# Patient Record
Sex: FEMALE | Race: WHITE | NOT HISPANIC OR LATINO
[De-identification: names, ages, dates, MRNs, and addresses within clinical notes are randomized per-mention and may not be internally consistent; named-entity substitution may affect disease eponyms.]

---

## 2019-07-17 PROBLEM — Z00.00 ENCOUNTER FOR PREVENTIVE HEALTH EXAMINATION: Status: ACTIVE | Noted: 2019-07-17

## 2019-08-16 ENCOUNTER — APPOINTMENT (OUTPATIENT)
Dept: ENDOCRINOLOGY | Facility: CLINIC | Age: 54
End: 2019-08-16

## 2019-08-20 ENCOUNTER — EMERGENCY (EMERGENCY)
Facility: HOSPITAL | Age: 54
LOS: 0 days | Discharge: HOME | End: 2019-08-21
Attending: EMERGENCY MEDICINE
Payer: COMMERCIAL

## 2019-08-20 VITALS — WEIGHT: 177.91 LBS | HEIGHT: 69 IN

## 2019-08-20 DIAGNOSIS — M25.571 PAIN IN RIGHT ANKLE AND JOINTS OF RIGHT FOOT: ICD-10-CM

## 2019-08-20 DIAGNOSIS — E03.9 HYPOTHYROIDISM, UNSPECIFIED: ICD-10-CM

## 2019-08-20 DIAGNOSIS — M79.662 PAIN IN LEFT LOWER LEG: ICD-10-CM

## 2019-08-20 DIAGNOSIS — S92.412A DISPLACED FRACTURE OF PROXIMAL PHALANX OF LEFT GREAT TOE, INITIAL ENCOUNTER FOR CLOSED FRACTURE: ICD-10-CM

## 2019-08-20 DIAGNOSIS — Z88.5 ALLERGY STATUS TO NARCOTIC AGENT: ICD-10-CM
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
## 2019-08-20 DIAGNOSIS — M79.661 PAIN IN RIGHT LOWER LEG: ICD-10-CM

## 2019-08-20 DIAGNOSIS — Z88.1 ALLERGY STATUS TO OTHER ANTIBIOTIC AGENTS STATUS: ICD-10-CM

## 2019-08-20 PROCEDURE — 73620 X-RAY EXAM OF FOOT: CPT | Mod: LT

## 2019-08-20 PROCEDURE — 99284 EMERGENCY DEPT VISIT MOD MDM: CPT

## 2019-08-20 PROCEDURE — 96372 THER/PROPH/DIAG INJ SC/IM: CPT | Mod: XU

## 2019-08-20 PROCEDURE — 73610 X-RAY EXAM OF ANKLE: CPT | Mod: 50

## 2019-08-20 PROCEDURE — 73590 X-RAY EXAM OF LOWER LEG: CPT | Mod: 50

## 2019-08-20 PROCEDURE — 73610 X-RAY EXAM OF ANKLE: CPT | Mod: 26,50

## 2019-08-20 PROCEDURE — 99284 EMERGENCY DEPT VISIT MOD MDM: CPT | Mod: 25

## 2019-08-20 PROCEDURE — 73590 X-RAY EXAM OF LOWER LEG: CPT | Mod: 26,50

## 2019-08-20 PROCEDURE — 29515 APPLICATION SHORT LEG SPLINT: CPT | Mod: LT

## 2019-08-20 PROCEDURE — 73620 X-RAY EXAM OF FOOT: CPT | Mod: 26,LT

## 2019-08-20 RX ORDER — KETOROLAC TROMETHAMINE 30 MG/ML
30 SYRINGE (ML) INJECTION ONCE
Refills: 0 | Status: DISCONTINUED | OUTPATIENT
Start: 2019-08-20 | End: 2019-08-20

## 2019-08-20 RX ORDER — OXYCODONE AND ACETAMINOPHEN 5; 325 MG/1; MG/1
1 TABLET ORAL ONCE
Refills: 0 | Status: DISCONTINUED | OUTPATIENT
Start: 2019-08-20 | End: 2019-08-20

## 2019-08-20 RX ADMIN — OXYCODONE AND ACETAMINOPHEN 1 TABLET(S): 5; 325 TABLET ORAL at 23:39

## 2019-08-20 RX ADMIN — OXYCODONE AND ACETAMINOPHEN 1 TABLET(S): 5; 325 TABLET ORAL at 23:01

## 2019-08-20 RX ADMIN — Medication 30 MILLIGRAM(S): at 23:39

## 2019-08-20 RX ADMIN — Medication 30 MILLIGRAM(S): at 23:01

## 2019-08-20 NOTE — ED PROVIDER NOTE - PHYSICAL EXAMINATION
TTP to b/l tibia with no deformities   right ankle TTP   left shin with multiple abrasions   TTP to left ankle and great toe
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
## 2019-08-20 NOTE — ED PROVIDER NOTE - CLINICAL SUMMARY MEDICAL DECISION MAKING FREE TEXT BOX
Left great toe middle phalynx fracture.  All other XRs appear okay.  Some scattered abrasions, cleaned and dressed by nursing.  Podiatry consulted, placed patient in posterior splint.  Plan for d/c home, f/u with Dr. Wells.

## 2019-08-21 VITALS
DIASTOLIC BLOOD PRESSURE: 55 MMHG | TEMPERATURE: 98 F | HEART RATE: 74 BPM | SYSTOLIC BLOOD PRESSURE: 116 MMHG | OXYGEN SATURATION: 98 % | RESPIRATION RATE: 18 BRPM

## 2019-08-21 RX ORDER — OXYCODONE AND ACETAMINOPHEN 5; 325 MG/1; MG/1
1 TABLET ORAL ONCE
Refills: 0 | Status: DISCONTINUED | OUTPATIENT
Start: 2019-08-21 | End: 2019-08-21

## 2019-08-21 RX ADMIN — OXYCODONE AND ACETAMINOPHEN 1 TABLET(S): 5; 325 TABLET ORAL at 00:38

## 2019-08-28 ENCOUNTER — TRANSCRIPTION ENCOUNTER (OUTPATIENT)
Age: 54
End: 2019-08-28

## 2019-08-29 ENCOUNTER — INPATIENT (INPATIENT)
Facility: HOSPITAL | Age: 54
LOS: 5 days | Discharge: HOME | DRG: 580 | End: 2019-09-04
Attending: ORTHOPAEDIC SURGERY | Admitting: ORTHOPAEDIC SURGERY
Payer: COMMERCIAL

## 2019-08-29 VITALS
OXYGEN SATURATION: 97 % | WEIGHT: 175.05 LBS | TEMPERATURE: 98 F | HEIGHT: 69 IN | HEART RATE: 80 BPM | DIASTOLIC BLOOD PRESSURE: 72 MMHG | SYSTOLIC BLOOD PRESSURE: 134 MMHG | RESPIRATION RATE: 16 BRPM

## 2019-08-29 DIAGNOSIS — Z90.711 ACQUIRED ABSENCE OF UTERUS WITH REMAINING CERVICAL STUMP: Chronic | ICD-10-CM

## 2019-08-29 DIAGNOSIS — Z98.890 OTHER SPECIFIED POSTPROCEDURAL STATES: Chronic | ICD-10-CM

## 2019-08-29 DIAGNOSIS — L03.116 CELLULITIS OF LEFT LOWER LIMB: ICD-10-CM

## 2019-08-29 DIAGNOSIS — Z90.49 ACQUIRED ABSENCE OF OTHER SPECIFIED PARTS OF DIGESTIVE TRACT: Chronic | ICD-10-CM

## 2019-08-29 DIAGNOSIS — Z90.09 ACQUIRED ABSENCE OF OTHER PART OF HEAD AND NECK: Chronic | ICD-10-CM

## 2019-08-29 PROBLEM — E03.9 HYPOTHYROIDISM, UNSPECIFIED: Chronic | Status: ACTIVE | Noted: 2019-08-21

## 2019-08-29 LAB
ALBUMIN SERPL ELPH-MCNC: 3.8 G/DL — SIGNIFICANT CHANGE UP (ref 3.3–5)
ALP SERPL-CCNC: 173 U/L — HIGH (ref 30–120)
ALT FLD-CCNC: 84 U/L DA — HIGH (ref 10–60)
ANION GAP SERPL CALC-SCNC: 6 MMOL/L — SIGNIFICANT CHANGE UP (ref 5–17)
APTT BLD: 36.5 SEC — SIGNIFICANT CHANGE UP (ref 28.5–37)
AST SERPL-CCNC: 33 U/L — SIGNIFICANT CHANGE UP (ref 10–40)
BASOPHILS # BLD AUTO: 0.09 K/UL — SIGNIFICANT CHANGE UP (ref 0–0.2)
BASOPHILS NFR BLD AUTO: 1.2 % — SIGNIFICANT CHANGE UP (ref 0–2)
BILIRUB SERPL-MCNC: 0.4 MG/DL — SIGNIFICANT CHANGE UP (ref 0.2–1.2)
BUN SERPL-MCNC: 14 MG/DL — SIGNIFICANT CHANGE UP (ref 7–23)
CALCIUM SERPL-MCNC: 9.4 MG/DL — SIGNIFICANT CHANGE UP (ref 8.4–10.5)
CHLORIDE SERPL-SCNC: 102 MMOL/L — SIGNIFICANT CHANGE UP (ref 96–108)
CO2 SERPL-SCNC: 28 MMOL/L — SIGNIFICANT CHANGE UP (ref 22–31)
CREAT SERPL-MCNC: 0.74 MG/DL — SIGNIFICANT CHANGE UP (ref 0.5–1.3)
EOSINOPHIL # BLD AUTO: 0.19 K/UL — SIGNIFICANT CHANGE UP (ref 0–0.5)
EOSINOPHIL NFR BLD AUTO: 2.6 % — SIGNIFICANT CHANGE UP (ref 0–6)
GLUCOSE SERPL-MCNC: 119 MG/DL — HIGH (ref 70–99)
HCG SERPL-ACNC: 2 MIU/ML — SIGNIFICANT CHANGE UP
HCT VFR BLD CALC: 40.1 % — SIGNIFICANT CHANGE UP (ref 34.5–45)
HGB BLD-MCNC: 13 G/DL — SIGNIFICANT CHANGE UP (ref 11.5–15.5)
IMM GRANULOCYTES NFR BLD AUTO: 0.6 % — SIGNIFICANT CHANGE UP (ref 0–1.5)
INR BLD: 1.13 RATIO — SIGNIFICANT CHANGE UP (ref 0.88–1.16)
LYMPHOCYTES # BLD AUTO: 1.83 K/UL — SIGNIFICANT CHANGE UP (ref 1–3.3)
LYMPHOCYTES # BLD AUTO: 25.2 % — SIGNIFICANT CHANGE UP (ref 13–44)
MCHC RBC-ENTMCNC: 30.1 PG — SIGNIFICANT CHANGE UP (ref 27–34)
MCHC RBC-ENTMCNC: 32.4 GM/DL — SIGNIFICANT CHANGE UP (ref 32–36)
MCV RBC AUTO: 92.8 FL — SIGNIFICANT CHANGE UP (ref 80–100)
MONOCYTES # BLD AUTO: 0.63 K/UL — SIGNIFICANT CHANGE UP (ref 0–0.9)
MONOCYTES NFR BLD AUTO: 8.7 % — SIGNIFICANT CHANGE UP (ref 2–14)
NEUTROPHILS # BLD AUTO: 4.47 K/UL — SIGNIFICANT CHANGE UP (ref 1.8–7.4)
NEUTROPHILS NFR BLD AUTO: 61.7 % — SIGNIFICANT CHANGE UP (ref 43–77)
NRBC # BLD: 0 /100 WBCS — SIGNIFICANT CHANGE UP (ref 0–0)
PLATELET # BLD AUTO: 380 K/UL — SIGNIFICANT CHANGE UP (ref 150–400)
POTASSIUM SERPL-MCNC: 3.5 MMOL/L — SIGNIFICANT CHANGE UP (ref 3.5–5.3)
POTASSIUM SERPL-SCNC: 3.5 MMOL/L — SIGNIFICANT CHANGE UP (ref 3.5–5.3)
PROT SERPL-MCNC: 8 G/DL — SIGNIFICANT CHANGE UP (ref 6–8.3)
PROTHROM AB SERPL-ACNC: 12.4 SEC — SIGNIFICANT CHANGE UP (ref 10–12.9)
RBC # BLD: 4.32 M/UL — SIGNIFICANT CHANGE UP (ref 3.8–5.2)
RBC # FLD: 13.1 % — SIGNIFICANT CHANGE UP (ref 10.3–14.5)
SODIUM SERPL-SCNC: 136 MMOL/L — SIGNIFICANT CHANGE UP (ref 135–145)
WBC # BLD: 7.25 K/UL — SIGNIFICANT CHANGE UP (ref 3.8–10.5)
WBC # FLD AUTO: 7.25 K/UL — SIGNIFICANT CHANGE UP (ref 3.8–10.5)

## 2019-08-29 PROCEDURE — 71045 X-RAY EXAM CHEST 1 VIEW: CPT | Mod: 26

## 2019-08-29 PROCEDURE — 93970 EXTREMITY STUDY: CPT | Mod: 26

## 2019-08-29 PROCEDURE — 93010 ELECTROCARDIOGRAM REPORT: CPT

## 2019-08-29 PROCEDURE — 99285 EMERGENCY DEPT VISIT HI MDM: CPT

## 2019-08-29 PROCEDURE — 99221 1ST HOSP IP/OBS SF/LOW 40: CPT

## 2019-08-29 RX ORDER — LEVOTHYROXINE SODIUM 125 MCG
1 TABLET ORAL
Qty: 0 | Refills: 0 | DISCHARGE

## 2019-08-29 RX ORDER — SODIUM CHLORIDE 9 MG/ML
1000 INJECTION, SOLUTION INTRAVENOUS
Refills: 0 | Status: DISCONTINUED | OUTPATIENT
Start: 2019-08-29 | End: 2019-08-30

## 2019-08-29 RX ORDER — ENOXAPARIN SODIUM 100 MG/ML
40 INJECTION SUBCUTANEOUS ONCE
Refills: 0 | Status: COMPLETED | OUTPATIENT
Start: 2019-08-29 | End: 2019-08-29

## 2019-08-29 RX ORDER — LEVOTHYROXINE SODIUM 125 MCG
137 TABLET ORAL DAILY
Refills: 0 | Status: DISCONTINUED | OUTPATIENT
Start: 2019-08-29 | End: 2019-08-30

## 2019-08-29 RX ORDER — ACETAMINOPHEN 500 MG
1000 TABLET ORAL EVERY 6 HOURS
Refills: 0 | Status: DISCONTINUED | OUTPATIENT
Start: 2019-08-29 | End: 2019-08-30

## 2019-08-29 RX ORDER — VANCOMYCIN HCL 1 G
1000 VIAL (EA) INTRAVENOUS ONCE
Refills: 0 | Status: COMPLETED | OUTPATIENT
Start: 2019-08-29 | End: 2019-08-29

## 2019-08-29 RX ORDER — TETANUS TOXOID, REDUCED DIPHTHERIA TOXOID AND ACELLULAR PERTUSSIS VACCINE, ADSORBED 5; 2.5; 8; 8; 2.5 [IU]/.5ML; [IU]/.5ML; UG/.5ML; UG/.5ML; UG/.5ML
0.5 SUSPENSION INTRAMUSCULAR ONCE
Refills: 0 | Status: COMPLETED | OUTPATIENT
Start: 2019-08-29 | End: 2019-08-29

## 2019-08-29 RX ORDER — PIPERACILLIN AND TAZOBACTAM 4; .5 G/20ML; G/20ML
3.38 INJECTION, POWDER, LYOPHILIZED, FOR SOLUTION INTRAVENOUS ONCE
Refills: 0 | Status: COMPLETED | OUTPATIENT
Start: 2019-08-29 | End: 2019-08-29

## 2019-08-29 RX ADMIN — PIPERACILLIN AND TAZOBACTAM 200 GRAM(S): 4; .5 INJECTION, POWDER, LYOPHILIZED, FOR SOLUTION INTRAVENOUS at 16:36

## 2019-08-29 RX ADMIN — Medication 250 MILLIGRAM(S): at 17:00

## 2019-08-29 RX ADMIN — Medication 1000 MILLIGRAM(S): at 22:02

## 2019-08-29 RX ADMIN — Medication 1000 MILLIGRAM(S): at 22:40

## 2019-08-29 RX ADMIN — TETANUS TOXOID, REDUCED DIPHTHERIA TOXOID AND ACELLULAR PERTUSSIS VACCINE, ADSORBED 0.5 MILLILITER(S): 5; 2.5; 8; 8; 2.5 SUSPENSION INTRAMUSCULAR at 16:58

## 2019-08-29 RX ADMIN — ENOXAPARIN SODIUM 40 MILLIGRAM(S): 100 INJECTION SUBCUTANEOUS at 22:02

## 2019-08-29 NOTE — ED ADULT NURSE NOTE - MUSCLE PAIN OR WEAKNESS
Pain and swelling both legs, laceratiion left leg, Fractured left big toe s/p fell off ladder in the dock/no
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
## 2019-08-29 NOTE — ED PROVIDER NOTE - MUSCULOSKELETAL, MLM
soft tissue tenderness to left lower leg surrounding wound and over left great toe. mild swelling and soft tissue tenderness to right lower leg. no palpable cords.  full ROM
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
## 2019-08-29 NOTE — H&P ADULT - ASSESSMENT
Infection left lower extremity  Fracture (closed treatment) left big toe  possible DVT right lower extremity (will confirm with Dopplers)

## 2019-08-29 NOTE — ED PROVIDER NOTE - SKIN, MLM
open wound to medical aspect of left lower leg with mild surrounding warmth and erythema. no red streaking. slight erythema to right lower leg
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
## 2019-08-29 NOTE — H&P ADULT - HISTORY OF PRESENT ILLNESS
Pt is a 54 year old female who presents today due to an infection in her left lower leg. The pt states that while climbing a ladder [place] a boat dock. She accidentally tripped and fell injuring both legs and her left foot. She states that she felt immediate pain and received several lacerations. She states that she went to [facility]. She was treated for the lacerations as well as a left big toe fracture. She was discharged that day. She states that she followed with Dr. Parisi first on August 22 or 23. Where here wounds were cleaned. She states that she was doing well until earlier today. When she felt increase redness, swelling, pain and discharge in the left lower leg. Cultures were obtained in the office. And she was recommended to come to the hospital for an I+D of the left leg.

## 2019-08-29 NOTE — ED ADULT NURSE NOTE - NSIMPLEMENTINTERV_GEN_ALL_ED
Implemented All Fall Risk Interventions:  [place] to call system. Call bell, personal items and telephone within reach. Instruct patient to call for assistance. Room bathroom lighting operational. Non-slip footwear when patient is off stretcher. Physically safe environment: no spills, clutter or unnecessary equipment. Stretcher in lowest position, wheels locked, appropriate side rails in place. Provide visual cue, wrist band, yellow gown, etc. Monitor gait and stability. Monitor for mental status changes and reorient to person, place, and time. Review medications for side effects contributing to fall risk. Reinforce activity limits and safety measures with patient and family.
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
## 2019-08-29 NOTE — ED PROVIDER NOTE - CLINICAL SUMMARY MEDICAL DECISION MAKING FREE TEXT BOX
increased pain, erythema, and drainage to previous lac on left lower leg past 2 days. sent in by ortho for admission for surgery tomorrow to clean out wound. also continued with calf pain and swelling. will doppler to eval for DVT. will check labs, CXR, EKG, start IV abx, and consult ortho PA for admission. patient declines pain meds at this time

## 2019-08-29 NOTE — ED ADULT NURSE NOTE - CHPI ED NUR SYMPTOMS POS
PAIN/REDNESS/Pain and swelling both legs, laceratiion left leg, Fractured left big toe s/p fell off ladder in the dock

## 2019-08-29 NOTE — H&P ADULT - ADDITIONAL PE
Left lower extremity:  Pt currently has an ace bandage with 4x4 on the lower 1/3 leg. The skin has some diffuse ecchymosis noted. The wound itself is a punture wound o the anterior medial aspect of the distal 1/3 leg. There is some whitish drainage noted with the surrounding tissue being red and warm. Pain and tenderness noted. The remaining lower extremity has some swelling and ecchymosis cecilia at the big toe (fracture). NVI with no gross evidence of sensory or motor loss. Good distal pulses. Mild calf tenderness. No evidence of impending compartment syndrome.    Right lower extremity: the skin is C/D/I. diffuse ecchymosis is noted throughout the right lower extremity cecilia posteriorly/ + calf tenderness. Some increase pain with passive dorsi-flexion. Toes are pink and mobile. Good distal pulses
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
## 2019-08-30 ENCOUNTER — RESULT REVIEW (OUTPATIENT)
Age: 54
End: 2019-08-30

## 2019-08-30 ENCOUNTER — TRANSCRIPTION ENCOUNTER (OUTPATIENT)
Age: 54
End: 2019-08-30

## 2019-08-30 LAB
APPEARANCE UR: CLEAR — SIGNIFICANT CHANGE UP
BILIRUB UR-MCNC: NEGATIVE — SIGNIFICANT CHANGE UP
COLOR SPEC: YELLOW — SIGNIFICANT CHANGE UP
CRP SERPL-MCNC: 1.46 MG/DL — HIGH (ref 0–0.4)
DIFF PNL FLD: NEGATIVE — SIGNIFICANT CHANGE UP
ERYTHROCYTE [SEDIMENTATION RATE] IN BLOOD: 36 MM/HR — HIGH (ref 0–20)
GLUCOSE UR QL: NEGATIVE MG/DL — SIGNIFICANT CHANGE UP
KETONES UR-MCNC: NEGATIVE — SIGNIFICANT CHANGE UP
LEUKOCYTE ESTERASE UR-ACNC: NEGATIVE — SIGNIFICANT CHANGE UP
NITRITE UR-MCNC: NEGATIVE — SIGNIFICANT CHANGE UP
PH UR: 5 — SIGNIFICANT CHANGE UP (ref 5–8)
PROCALCITONIN SERPL-MCNC: <0.02 NG/ML — SIGNIFICANT CHANGE UP (ref 0.02–0.1)
PROT UR-MCNC: NEGATIVE MG/DL — SIGNIFICANT CHANGE UP
SP GR SPEC: 1.02 — SIGNIFICANT CHANGE UP (ref 1.01–1.02)
UROBILINOGEN FLD QL: NEGATIVE MG/DL — SIGNIFICANT CHANGE UP

## 2019-08-30 PROCEDURE — 88304 TISSUE EXAM BY PATHOLOGIST: CPT | Mod: 26

## 2019-08-30 PROCEDURE — 99233 SBSQ HOSP IP/OBS HIGH 50: CPT

## 2019-08-30 RX ORDER — ACETAMINOPHEN 500 MG
1000 TABLET ORAL EVERY 6 HOURS
Refills: 0 | Status: COMPLETED | OUTPATIENT
Start: 2019-08-30 | End: 2019-08-31

## 2019-08-30 RX ORDER — ENOXAPARIN SODIUM 100 MG/ML
40 INJECTION SUBCUTANEOUS EVERY 24 HOURS
Refills: 0 | Status: DISCONTINUED | OUTPATIENT
Start: 2019-08-31 | End: 2019-09-02

## 2019-08-30 RX ORDER — SODIUM CHLORIDE 9 MG/ML
1000 INJECTION, SOLUTION INTRAVENOUS
Refills: 0 | Status: DISCONTINUED | OUTPATIENT
Start: 2019-08-30 | End: 2019-08-30

## 2019-08-30 RX ORDER — PIPERACILLIN AND TAZOBACTAM 4; .5 G/20ML; G/20ML
3.38 INJECTION, POWDER, LYOPHILIZED, FOR SOLUTION INTRAVENOUS EVERY 8 HOURS
Refills: 0 | Status: DISCONTINUED | OUTPATIENT
Start: 2019-08-30 | End: 2019-08-30

## 2019-08-30 RX ORDER — OXYCODONE HYDROCHLORIDE 5 MG/1
10 TABLET ORAL
Refills: 0 | Status: DISCONTINUED | OUTPATIENT
Start: 2019-08-30 | End: 2019-09-04

## 2019-08-30 RX ORDER — ACETAMINOPHEN 500 MG
1000 TABLET ORAL EVERY 8 HOURS
Refills: 0 | Status: DISCONTINUED | OUTPATIENT
Start: 2019-08-31 | End: 2019-09-04

## 2019-08-30 RX ORDER — VANCOMYCIN HCL 1 G
1250 VIAL (EA) INTRAVENOUS ONCE
Refills: 0 | Status: DISCONTINUED | OUTPATIENT
Start: 2019-08-30 | End: 2019-08-30

## 2019-08-30 RX ORDER — PIPERACILLIN AND TAZOBACTAM 4; .5 G/20ML; G/20ML
3.38 INJECTION, POWDER, LYOPHILIZED, FOR SOLUTION INTRAVENOUS ONCE
Refills: 0 | Status: DISCONTINUED | OUTPATIENT
Start: 2019-08-30 | End: 2019-08-30

## 2019-08-30 RX ORDER — OXYCODONE HYDROCHLORIDE 5 MG/1
5 TABLET ORAL
Refills: 0 | Status: DISCONTINUED | OUTPATIENT
Start: 2019-08-30 | End: 2019-09-04

## 2019-08-30 RX ORDER — PANTOPRAZOLE SODIUM 20 MG/1
40 TABLET, DELAYED RELEASE ORAL
Refills: 0 | Status: DISCONTINUED | OUTPATIENT
Start: 2019-08-30 | End: 2019-09-04

## 2019-08-30 RX ORDER — SODIUM CHLORIDE 9 MG/ML
1000 INJECTION, SOLUTION INTRAVENOUS
Refills: 0 | Status: DISCONTINUED | OUTPATIENT
Start: 2019-08-30 | End: 2019-08-31

## 2019-08-30 RX ORDER — PIPERACILLIN AND TAZOBACTAM 4; .5 G/20ML; G/20ML
3.38 INJECTION, POWDER, LYOPHILIZED, FOR SOLUTION INTRAVENOUS ONCE
Refills: 0 | Status: COMPLETED | OUTPATIENT
Start: 2019-08-30 | End: 2019-08-30

## 2019-08-30 RX ORDER — VANCOMYCIN HCL 1 G
1250 VIAL (EA) INTRAVENOUS EVERY 12 HOURS
Refills: 0 | Status: DISCONTINUED | OUTPATIENT
Start: 2019-08-30 | End: 2019-08-30

## 2019-08-30 RX ORDER — HYDROMORPHONE HYDROCHLORIDE 2 MG/ML
0.5 INJECTION INTRAMUSCULAR; INTRAVENOUS; SUBCUTANEOUS
Refills: 0 | Status: DISCONTINUED | OUTPATIENT
Start: 2019-08-30 | End: 2019-08-30

## 2019-08-30 RX ORDER — ONDANSETRON 8 MG/1
4 TABLET, FILM COATED ORAL ONCE
Refills: 0 | Status: DISCONTINUED | OUTPATIENT
Start: 2019-08-30 | End: 2019-08-30

## 2019-08-30 RX ORDER — VANCOMYCIN HCL 1 G
1250 VIAL (EA) INTRAVENOUS EVERY 12 HOURS
Refills: 0 | Status: DISCONTINUED | OUTPATIENT
Start: 2019-08-31 | End: 2019-09-01

## 2019-08-30 RX ORDER — PIPERACILLIN AND TAZOBACTAM 4; .5 G/20ML; G/20ML
3.38 INJECTION, POWDER, LYOPHILIZED, FOR SOLUTION INTRAVENOUS EVERY 8 HOURS
Refills: 0 | Status: DISCONTINUED | OUTPATIENT
Start: 2019-08-30 | End: 2019-09-02

## 2019-08-30 RX ORDER — LEVOTHYROXINE SODIUM 125 MCG
137 TABLET ORAL DAILY
Refills: 0 | Status: DISCONTINUED | OUTPATIENT
Start: 2019-08-30 | End: 2019-09-04

## 2019-08-30 RX ADMIN — Medication 137 MICROGRAM(S): at 05:53

## 2019-08-30 RX ADMIN — PIPERACILLIN AND TAZOBACTAM 200 GRAM(S): 4; .5 INJECTION, POWDER, LYOPHILIZED, FOR SOLUTION INTRAVENOUS at 17:18

## 2019-08-30 RX ADMIN — SODIUM CHLORIDE 125 MILLILITER(S): 9 INJECTION, SOLUTION INTRAVENOUS at 21:59

## 2019-08-30 RX ADMIN — SODIUM CHLORIDE 50 MILLILITER(S): 9 INJECTION, SOLUTION INTRAVENOUS at 15:02

## 2019-08-30 RX ADMIN — HYDROMORPHONE HYDROCHLORIDE 0.5 MILLIGRAM(S): 2 INJECTION INTRAMUSCULAR; INTRAVENOUS; SUBCUTANEOUS at 15:42

## 2019-08-30 RX ADMIN — Medication 400 MILLIGRAM(S): at 15:26

## 2019-08-30 RX ADMIN — Medication 1000 MILLIGRAM(S): at 16:00

## 2019-08-30 RX ADMIN — HYDROMORPHONE HYDROCHLORIDE 0.5 MILLIGRAM(S): 2 INJECTION INTRAMUSCULAR; INTRAVENOUS; SUBCUTANEOUS at 15:00

## 2019-08-30 RX ADMIN — OXYCODONE HYDROCHLORIDE 10 MILLIGRAM(S): 5 TABLET ORAL at 19:47

## 2019-08-30 RX ADMIN — HYDROMORPHONE HYDROCHLORIDE 0.5 MILLIGRAM(S): 2 INJECTION INTRAMUSCULAR; INTRAVENOUS; SUBCUTANEOUS at 15:15

## 2019-08-30 RX ADMIN — OXYCODONE HYDROCHLORIDE 10 MILLIGRAM(S): 5 TABLET ORAL at 20:17

## 2019-08-30 RX ADMIN — Medication 400 MILLIGRAM(S): at 22:00

## 2019-08-30 RX ADMIN — Medication 1000 MILLIGRAM(S): at 22:30

## 2019-08-30 NOTE — BRIEF OPERATIVE NOTE - NSICDXBRIEFPREOP_GEN_ALL_CORE_FT
PRE-OP DIAGNOSIS:  Foreign body in foot, left 30-Aug-2019 15:18:19  Adria Mireles  Open wound 30-Aug-2019 15:17:47 left tibia Adria Mireles  Wound, open 30-Aug-2019 15:17:27 left tibia Adria Mireles

## 2019-08-30 NOTE — BRIEF OPERATIVE NOTE - NSICDXBRIEFPROCEDURE_GEN_ALL_CORE_FT
PROCEDURES:  Incision and drainage of left tibia 30-Aug-2019 15:16:14  Adria Mireles  Removal of superficial foreign body from foot 30-Aug-2019 15:15:54  Adria Mireles

## 2019-08-30 NOTE — DISCHARGE NOTE NURSING/CASE MANAGEMENT/SOCIAL WORK - PATIENT PORTAL LINK FT
You can access the FollowMyHealth Patient Portal offered by [facility] by registering at the following website: [email or web address]. By joining [employer]’s FollowMyHealth portal, you will also be able to view your health information using other applications (apps) compatible with our system.

## 2019-08-30 NOTE — PROGRESS NOTE ADULT - ASSESSMENT
Pt is a 54 year old female Past surgical history of Partial thyroidectomy for precancerous nodules, 2 back fusion surgeries,  who presents today due to an infection in her left lower leg. The pt states that [place]. She accidentally tripped and fell injuring both legs and her left foot. She states that she felt immediate pain and received several lacerations. She states that she went to [facility]. She was treated for the lacerations as well as a left big toe fracture. She was discharged that day. She states that she followed with Dr. Parisi first on August 22 or 23. Where here wounds were cleaned. She states that she was doing well until earlier today. When she felt increase redness, swelling, pain and discharge in the left lower leg. Cultures were obtained in the office. And she was recommended to come to the hospital for an I+D of the left leg.    # Cellulitis and Laceration of LLE  - ID consult appreciated  - vanco and zosyn  - cultures pending   - i and d today via ortho     # Partial Thyroidectomy now with hypothyroidism   - Synthroid 137 mcg.     #DVT prophylaxis   - venodynes Pt is a 54 year old female Past surgical history of Partial thyroidectomy for precancerous nodules, 2 back fusion surgeries,  who presents today due to an infection in her left lower leg. The pt states that while [place] a [place]. She accidentally tripped and fell injuring both legs and her left foot. She states that she felt immediate pain and received several lacerations. She states that she went to [facility]. She was treated for the lacerations as well as a left big toe fracture. She was discharged that day. She states that she followed with Dr. Parisi first on August 22 or 23. Where here wounds were cleaned. She states that she was doing well until earlier today. When she felt increase redness, swelling, pain and discharge in the left lower leg. Cultures were obtained in the office. And she was recommended to come to the hospital for an I+D of the left leg.    # Cellulitis and Laceration of LLE  - ID consult appreciated  - vanco and zosyn  - cultures pending   - i and d today via ortho     # hx of Partial Thyroidectomy now with hypothyroidism   - Synthroid 137 mcg.     #DVT prophylaxis   - venodynes

## 2019-08-30 NOTE — BRIEF OPERATIVE NOTE - NSICDXBRIEFPOSTOP_GEN_ALL_CORE_FT
POST-OP DIAGNOSIS:  Foreign body in left foot 30-Aug-2019 15:19:13  Adria Mireles  Open wound 30-Aug-2019 15:19:00 left tibia Adria Mireles
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
## 2019-08-31 LAB
ANION GAP SERPL CALC-SCNC: 10 MMOL/L — SIGNIFICANT CHANGE UP (ref 5–17)
BASOPHILS # BLD AUTO: 0.02 K/UL — SIGNIFICANT CHANGE UP (ref 0–0.2)
BASOPHILS NFR BLD AUTO: 0.2 % — SIGNIFICANT CHANGE UP (ref 0–2)
BUN SERPL-MCNC: 9 MG/DL — SIGNIFICANT CHANGE UP (ref 7–23)
CALCIUM SERPL-MCNC: 9.2 MG/DL — SIGNIFICANT CHANGE UP (ref 8.4–10.5)
CHLORIDE SERPL-SCNC: 102 MMOL/L — SIGNIFICANT CHANGE UP (ref 96–108)
CO2 SERPL-SCNC: 26 MMOL/L — SIGNIFICANT CHANGE UP (ref 22–31)
CREAT SERPL-MCNC: 0.75 MG/DL — SIGNIFICANT CHANGE UP (ref 0.5–1.3)
EOSINOPHIL # BLD AUTO: 0 K/UL — SIGNIFICANT CHANGE UP (ref 0–0.5)
EOSINOPHIL NFR BLD AUTO: 0 % — SIGNIFICANT CHANGE UP (ref 0–6)
GLUCOSE SERPL-MCNC: 138 MG/DL — HIGH (ref 70–99)
GRAM STN FLD: SIGNIFICANT CHANGE UP
HCT VFR BLD CALC: 38.3 % — SIGNIFICANT CHANGE UP (ref 34.5–45)
HGB BLD-MCNC: 12.3 G/DL — SIGNIFICANT CHANGE UP (ref 11.5–15.5)
IMM GRANULOCYTES NFR BLD AUTO: 0.6 % — SIGNIFICANT CHANGE UP (ref 0–1.5)
LYMPHOCYTES # BLD AUTO: 1.2 K/UL — SIGNIFICANT CHANGE UP (ref 1–3.3)
LYMPHOCYTES # BLD AUTO: 11.4 % — LOW (ref 13–44)
MCHC RBC-ENTMCNC: 30.1 PG — SIGNIFICANT CHANGE UP (ref 27–34)
MCHC RBC-ENTMCNC: 32.1 GM/DL — SIGNIFICANT CHANGE UP (ref 32–36)
MCV RBC AUTO: 93.9 FL — SIGNIFICANT CHANGE UP (ref 80–100)
MONOCYTES # BLD AUTO: 0.57 K/UL — SIGNIFICANT CHANGE UP (ref 0–0.9)
MONOCYTES NFR BLD AUTO: 5.4 % — SIGNIFICANT CHANGE UP (ref 2–14)
NEUTROPHILS # BLD AUTO: 8.66 K/UL — HIGH (ref 1.8–7.4)
NEUTROPHILS NFR BLD AUTO: 82.4 % — HIGH (ref 43–77)
NRBC # BLD: 0 /100 WBCS — SIGNIFICANT CHANGE UP (ref 0–0)
PLATELET # BLD AUTO: 396 K/UL — SIGNIFICANT CHANGE UP (ref 150–400)
POTASSIUM SERPL-MCNC: 4.1 MMOL/L — SIGNIFICANT CHANGE UP (ref 3.5–5.3)
POTASSIUM SERPL-SCNC: 4.1 MMOL/L — SIGNIFICANT CHANGE UP (ref 3.5–5.3)
RBC # BLD: 4.08 M/UL — SIGNIFICANT CHANGE UP (ref 3.8–5.2)
RBC # FLD: 13 % — SIGNIFICANT CHANGE UP (ref 10.3–14.5)
SODIUM SERPL-SCNC: 138 MMOL/L — SIGNIFICANT CHANGE UP (ref 135–145)
SPECIMEN SOURCE: SIGNIFICANT CHANGE UP
WBC # BLD: 10.51 K/UL — HIGH (ref 3.8–10.5)
WBC # FLD AUTO: 10.51 K/UL — HIGH (ref 3.8–10.5)

## 2019-08-31 PROCEDURE — 99232 SBSQ HOSP IP/OBS MODERATE 35: CPT

## 2019-08-31 RX ADMIN — ENOXAPARIN SODIUM 40 MILLIGRAM(S): 100 INJECTION SUBCUTANEOUS at 10:02

## 2019-08-31 RX ADMIN — PIPERACILLIN AND TAZOBACTAM 25 GRAM(S): 4; .5 INJECTION, POWDER, LYOPHILIZED, FOR SOLUTION INTRAVENOUS at 08:13

## 2019-08-31 RX ADMIN — Medication 1000 MILLIGRAM(S): at 09:35

## 2019-08-31 RX ADMIN — OXYCODONE HYDROCHLORIDE 5 MILLIGRAM(S): 5 TABLET ORAL at 05:32

## 2019-08-31 RX ADMIN — Medication 137 MICROGRAM(S): at 05:32

## 2019-08-31 RX ADMIN — OXYCODONE HYDROCHLORIDE 5 MILLIGRAM(S): 5 TABLET ORAL at 06:02

## 2019-08-31 RX ADMIN — Medication 1000 MILLIGRAM(S): at 16:56

## 2019-08-31 RX ADMIN — PANTOPRAZOLE SODIUM 40 MILLIGRAM(S): 20 TABLET, DELAYED RELEASE ORAL at 05:32

## 2019-08-31 RX ADMIN — Medication 400 MILLIGRAM(S): at 03:26

## 2019-08-31 RX ADMIN — Medication 1000 MILLIGRAM(S): at 17:36

## 2019-08-31 RX ADMIN — Medication 30 MILLILITER(S): at 03:38

## 2019-08-31 RX ADMIN — Medication 1000 MILLIGRAM(S): at 03:56

## 2019-08-31 RX ADMIN — PIPERACILLIN AND TAZOBACTAM 25 GRAM(S): 4; .5 INJECTION, POWDER, LYOPHILIZED, FOR SOLUTION INTRAVENOUS at 02:13

## 2019-08-31 RX ADMIN — Medication 5 MILLIGRAM(S): at 16:56

## 2019-08-31 RX ADMIN — Medication 1000 MILLIGRAM(S): at 10:30

## 2019-08-31 RX ADMIN — Medication 166.67 MILLIGRAM(S): at 13:19

## 2019-08-31 RX ADMIN — Medication 166.67 MILLIGRAM(S): at 02:13

## 2019-08-31 RX ADMIN — PIPERACILLIN AND TAZOBACTAM 25 GRAM(S): 4; .5 INJECTION, POWDER, LYOPHILIZED, FOR SOLUTION INTRAVENOUS at 16:56

## 2019-08-31 NOTE — PHYSICAL THERAPY INITIAL EVALUATION ADULT - GAIT TRAINING, PT EVAL
Ambulate 50 feet with RW independently in 1 week. Negotiate 5 steps with cane and HR independently in 1 week

## 2019-08-31 NOTE — PROGRESS NOTE ADULT - ASSESSMENT
55 yo F with hx of Partial thyroidectomy c/b hypothyroidism,  s/p back fusion ,  admitted for evaluation of  infection in her left lower leg. Recently in [facility]. She was treated for lacerations and discharged with outpatient followup . Now in [facility] for increased swelling, pain and discharge in the left lower leg from likely a fall from a ladder. Cultures obtained in outpatient office. Now admitted for treatment and I+D of the left leg with new found Foreign body.    # Cellulitis and Laceration of LLE  s/p I/D via ortho  id following  on vanco and zosyn  followup cultures   wound vac    # hx of Partial Thyroidectomy now with hypothyroidism   - Synthroid 137 mcg.     #DVT prophylaxis   scd

## 2019-08-31 NOTE — PHYSICAL THERAPY INITIAL EVALUATION ADULT - ACTIVE RANGE OF MOTION EXAMINATION, REHAB EVAL
RLE Active ROM was WNL (within normal limits)/neelima. upper extremity Active ROM was WNL (within normal limits)/deficits as listed below/left LE NT due to sx

## 2019-08-31 NOTE — PHYSICAL THERAPY INITIAL EVALUATION ADULT - MANUAL MUSCLE TESTING RESULTS, REHAB EVAL
grossly assessed due to/grossly assessed throughout Bilateral UE and right LE 5/5. Left LE NT due to sx

## 2019-09-01 LAB
-  AMIKACIN: SIGNIFICANT CHANGE UP
-  AMOXICILLIN/CLAVULANIC ACID: SIGNIFICANT CHANGE UP
-  AMOXICILLIN/CLAVULANIC ACID: SIGNIFICANT CHANGE UP
-  AMPICILLIN/SULBACTAM: SIGNIFICANT CHANGE UP
-  AMPICILLIN: SIGNIFICANT CHANGE UP
-  AMPICILLIN: SIGNIFICANT CHANGE UP
-  AZTREONAM: SIGNIFICANT CHANGE UP
-  CEFAZOLIN: SIGNIFICANT CHANGE UP
-  CEFEPIME: SIGNIFICANT CHANGE UP
-  CEFOXITIN: SIGNIFICANT CHANGE UP
-  CEFTAZIDIME: SIGNIFICANT CHANGE UP
-  CEFTRIAXONE: SIGNIFICANT CHANGE UP
-  CIPROFLOXACIN: SIGNIFICANT CHANGE UP
-  ERTAPENEM: SIGNIFICANT CHANGE UP
-  ERTAPENEM: SIGNIFICANT CHANGE UP
-  GENTAMICIN: SIGNIFICANT CHANGE UP
-  IMIPENEM: SIGNIFICANT CHANGE UP
-  IMIPENEM: SIGNIFICANT CHANGE UP
-  LEVOFLOXACIN: SIGNIFICANT CHANGE UP
-  MEROPENEM: SIGNIFICANT CHANGE UP
-  PIPERACILLIN/TAZOBACTAM: SIGNIFICANT CHANGE UP
-  TOBRAMYCIN: SIGNIFICANT CHANGE UP
-  TOBRAMYCIN: SIGNIFICANT CHANGE UP
-  TRIMETHOPRIM/SULFAMETHOXAZOLE: SIGNIFICANT CHANGE UP
ANION GAP SERPL CALC-SCNC: 7 MMOL/L — SIGNIFICANT CHANGE UP (ref 5–17)
BASOPHILS # BLD AUTO: 0.08 K/UL — SIGNIFICANT CHANGE UP (ref 0–0.2)
BASOPHILS NFR BLD AUTO: 1.2 % — SIGNIFICANT CHANGE UP (ref 0–2)
BUN SERPL-MCNC: 14 MG/DL — SIGNIFICANT CHANGE UP (ref 7–23)
CALCIUM SERPL-MCNC: 9.1 MG/DL — SIGNIFICANT CHANGE UP (ref 8.4–10.5)
CHLORIDE SERPL-SCNC: 105 MMOL/L — SIGNIFICANT CHANGE UP (ref 96–108)
CO2 SERPL-SCNC: 30 MMOL/L — SIGNIFICANT CHANGE UP (ref 22–31)
CREAT SERPL-MCNC: 0.94 MG/DL — SIGNIFICANT CHANGE UP (ref 0.5–1.3)
EOSINOPHIL # BLD AUTO: 0.13 K/UL — SIGNIFICANT CHANGE UP (ref 0–0.5)
EOSINOPHIL NFR BLD AUTO: 1.9 % — SIGNIFICANT CHANGE UP (ref 0–6)
GLUCOSE SERPL-MCNC: 96 MG/DL — SIGNIFICANT CHANGE UP (ref 70–99)
HCT VFR BLD CALC: 35.6 % — SIGNIFICANT CHANGE UP (ref 34.5–45)
HGB BLD-MCNC: 11.2 G/DL — LOW (ref 11.5–15.5)
IMM GRANULOCYTES NFR BLD AUTO: 0.3 % — SIGNIFICANT CHANGE UP (ref 0–1.5)
LYMPHOCYTES # BLD AUTO: 2.58 K/UL — SIGNIFICANT CHANGE UP (ref 1–3.3)
LYMPHOCYTES # BLD AUTO: 38.1 % — SIGNIFICANT CHANGE UP (ref 13–44)
MCHC RBC-ENTMCNC: 29.9 PG — SIGNIFICANT CHANGE UP (ref 27–34)
MCHC RBC-ENTMCNC: 31.5 GM/DL — LOW (ref 32–36)
MCV RBC AUTO: 95.2 FL — SIGNIFICANT CHANGE UP (ref 80–100)
METHOD TYPE: SIGNIFICANT CHANGE UP
MONOCYTES # BLD AUTO: 0.51 K/UL — SIGNIFICANT CHANGE UP (ref 0–0.9)
MONOCYTES NFR BLD AUTO: 7.5 % — SIGNIFICANT CHANGE UP (ref 2–14)
NEUTROPHILS # BLD AUTO: 3.45 K/UL — SIGNIFICANT CHANGE UP (ref 1.8–7.4)
NEUTROPHILS NFR BLD AUTO: 51 % — SIGNIFICANT CHANGE UP (ref 43–77)
NRBC # BLD: 0 /100 WBCS — SIGNIFICANT CHANGE UP (ref 0–0)
PLATELET # BLD AUTO: 367 K/UL — SIGNIFICANT CHANGE UP (ref 150–400)
POTASSIUM SERPL-MCNC: 4 MMOL/L — SIGNIFICANT CHANGE UP (ref 3.5–5.3)
POTASSIUM SERPL-SCNC: 4 MMOL/L — SIGNIFICANT CHANGE UP (ref 3.5–5.3)
RBC # BLD: 3.74 M/UL — LOW (ref 3.8–5.2)
RBC # FLD: 13.5 % — SIGNIFICANT CHANGE UP (ref 10.3–14.5)
SODIUM SERPL-SCNC: 142 MMOL/L — SIGNIFICANT CHANGE UP (ref 135–145)
VANCOMYCIN TROUGH SERPL-MCNC: 18.1 UG/ML — SIGNIFICANT CHANGE UP (ref 10–20)
WBC # BLD: 6.77 K/UL — SIGNIFICANT CHANGE UP (ref 3.8–10.5)
WBC # FLD AUTO: 6.77 K/UL — SIGNIFICANT CHANGE UP (ref 3.8–10.5)

## 2019-09-01 PROCEDURE — 99232 SBSQ HOSP IP/OBS MODERATE 35: CPT

## 2019-09-01 RX ADMIN — Medication 1000 MILLIGRAM(S): at 01:33

## 2019-09-01 RX ADMIN — Medication 1000 MILLIGRAM(S): at 18:00

## 2019-09-01 RX ADMIN — Medication 1000 MILLIGRAM(S): at 09:30

## 2019-09-01 RX ADMIN — PIPERACILLIN AND TAZOBACTAM 25 GRAM(S): 4; .5 INJECTION, POWDER, LYOPHILIZED, FOR SOLUTION INTRAVENOUS at 17:02

## 2019-09-01 RX ADMIN — Medication 166.67 MILLIGRAM(S): at 02:35

## 2019-09-01 RX ADMIN — PANTOPRAZOLE SODIUM 40 MILLIGRAM(S): 20 TABLET, DELAYED RELEASE ORAL at 06:37

## 2019-09-01 RX ADMIN — ENOXAPARIN SODIUM 40 MILLIGRAM(S): 100 INJECTION SUBCUTANEOUS at 08:40

## 2019-09-01 RX ADMIN — Medication 1000 MILLIGRAM(S): at 00:49

## 2019-09-01 RX ADMIN — PIPERACILLIN AND TAZOBACTAM 25 GRAM(S): 4; .5 INJECTION, POWDER, LYOPHILIZED, FOR SOLUTION INTRAVENOUS at 00:50

## 2019-09-01 RX ADMIN — Medication 1000 MILLIGRAM(S): at 08:41

## 2019-09-01 RX ADMIN — PIPERACILLIN AND TAZOBACTAM 25 GRAM(S): 4; .5 INJECTION, POWDER, LYOPHILIZED, FOR SOLUTION INTRAVENOUS at 08:41

## 2019-09-01 RX ADMIN — Medication 137 MICROGRAM(S): at 06:37

## 2019-09-01 RX ADMIN — Medication 1000 MILLIGRAM(S): at 17:02

## 2019-09-01 NOTE — PROGRESS NOTE ADULT - ASSESSMENT
55 yo F with hx of Partial thyroidectomy c/b hypothyroidism,  s/p back fusion ,  admitted for evaluation of  infection in her left lower leg. Recently in [facility]. She was treated for lacerations and discharged with outpatient followup . Now in [facility] for increased swelling, pain and discharge in the left lower leg from likely a fall from a ladder. Cultures obtained in outpatient office. Now admitted for treatment and I+D of the left leg with new found Foreign body.    # Cellulitis and Laceration of LLE  s/p I/D via ortho  id following  s/p vanco and zosyn : off vanco  preliminary cx positive for gram negative rods  wound vac    # hx of Partial Thyroidectomy now with hypothyroidism 	  - Synthroid 137 mcg.     #DVT prophylaxis   will change to lovenox

## 2019-09-02 LAB
-  AMIKACIN: SIGNIFICANT CHANGE UP
-  AMOXICILLIN/CLAVULANIC ACID: SIGNIFICANT CHANGE UP
-  AMPICILLIN/SULBACTAM: SIGNIFICANT CHANGE UP
-  AMPICILLIN: SIGNIFICANT CHANGE UP
-  AZTREONAM: SIGNIFICANT CHANGE UP
-  CEFAZOLIN: SIGNIFICANT CHANGE UP
-  CEFEPIME: SIGNIFICANT CHANGE UP
-  CEFOXITIN: SIGNIFICANT CHANGE UP
-  CEFTRIAXONE: SIGNIFICANT CHANGE UP
-  CIPROFLOXACIN: SIGNIFICANT CHANGE UP
-  ERTAPENEM: SIGNIFICANT CHANGE UP
-  GENTAMICIN: SIGNIFICANT CHANGE UP
-  LEVOFLOXACIN: SIGNIFICANT CHANGE UP
-  MEROPENEM: SIGNIFICANT CHANGE UP
-  PIPERACILLIN/TAZOBACTAM: SIGNIFICANT CHANGE UP
-  TOBRAMYCIN: SIGNIFICANT CHANGE UP
-  TRIMETHOPRIM/SULFAMETHOXAZOLE: SIGNIFICANT CHANGE UP
ANION GAP SERPL CALC-SCNC: 9 MMOL/L — SIGNIFICANT CHANGE UP (ref 5–17)
BASOPHILS # BLD AUTO: 0.09 K/UL — SIGNIFICANT CHANGE UP (ref 0–0.2)
BASOPHILS NFR BLD AUTO: 1.5 % — SIGNIFICANT CHANGE UP (ref 0–2)
BUN SERPL-MCNC: 13 MG/DL — SIGNIFICANT CHANGE UP (ref 7–23)
CALCIUM SERPL-MCNC: 9 MG/DL — SIGNIFICANT CHANGE UP (ref 8.4–10.5)
CHLORIDE SERPL-SCNC: 105 MMOL/L — SIGNIFICANT CHANGE UP (ref 96–108)
CO2 SERPL-SCNC: 28 MMOL/L — SIGNIFICANT CHANGE UP (ref 22–31)
CREAT SERPL-MCNC: 0.88 MG/DL — SIGNIFICANT CHANGE UP (ref 0.5–1.3)
EOSINOPHIL # BLD AUTO: 0.19 K/UL — SIGNIFICANT CHANGE UP (ref 0–0.5)
EOSINOPHIL NFR BLD AUTO: 3.2 % — SIGNIFICANT CHANGE UP (ref 0–6)
GLUCOSE SERPL-MCNC: 104 MG/DL — HIGH (ref 70–99)
HCT VFR BLD CALC: 34.2 % — LOW (ref 34.5–45)
HGB BLD-MCNC: 10.9 G/DL — LOW (ref 11.5–15.5)
IMM GRANULOCYTES NFR BLD AUTO: 0.7 % — SIGNIFICANT CHANGE UP (ref 0–1.5)
LYMPHOCYTES # BLD AUTO: 2.1 K/UL — SIGNIFICANT CHANGE UP (ref 1–3.3)
LYMPHOCYTES # BLD AUTO: 35.7 % — SIGNIFICANT CHANGE UP (ref 13–44)
MCHC RBC-ENTMCNC: 30 PG — SIGNIFICANT CHANGE UP (ref 27–34)
MCHC RBC-ENTMCNC: 31.9 GM/DL — LOW (ref 32–36)
MCV RBC AUTO: 94.2 FL — SIGNIFICANT CHANGE UP (ref 80–100)
METHOD TYPE: SIGNIFICANT CHANGE UP
MONOCYTES # BLD AUTO: 0.6 K/UL — SIGNIFICANT CHANGE UP (ref 0–0.9)
MONOCYTES NFR BLD AUTO: 10.2 % — SIGNIFICANT CHANGE UP (ref 2–14)
NEUTROPHILS # BLD AUTO: 2.87 K/UL — SIGNIFICANT CHANGE UP (ref 1.8–7.4)
NEUTROPHILS NFR BLD AUTO: 48.7 % — SIGNIFICANT CHANGE UP (ref 43–77)
NRBC # BLD: 0 /100 WBCS — SIGNIFICANT CHANGE UP (ref 0–0)
PLATELET # BLD AUTO: 333 K/UL — SIGNIFICANT CHANGE UP (ref 150–400)
POTASSIUM SERPL-MCNC: 4 MMOL/L — SIGNIFICANT CHANGE UP (ref 3.5–5.3)
POTASSIUM SERPL-SCNC: 4 MMOL/L — SIGNIFICANT CHANGE UP (ref 3.5–5.3)
RBC # BLD: 3.63 M/UL — LOW (ref 3.8–5.2)
RBC # FLD: 13.2 % — SIGNIFICANT CHANGE UP (ref 10.3–14.5)
SODIUM SERPL-SCNC: 142 MMOL/L — SIGNIFICANT CHANGE UP (ref 135–145)
WBC # BLD: 5.89 K/UL — SIGNIFICANT CHANGE UP (ref 3.8–10.5)
WBC # FLD AUTO: 5.89 K/UL — SIGNIFICANT CHANGE UP (ref 3.8–10.5)

## 2019-09-02 PROCEDURE — 99232 SBSQ HOSP IP/OBS MODERATE 35: CPT

## 2019-09-02 RX ORDER — ASPIRIN/CALCIUM CARB/MAGNESIUM 324 MG
1 TABLET ORAL
Qty: 0 | Refills: 0 | DISCHARGE
Start: 2019-09-02

## 2019-09-02 RX ORDER — PANTOPRAZOLE SODIUM 20 MG/1
1 TABLET, DELAYED RELEASE ORAL
Qty: 14 | Refills: 0
Start: 2019-09-02 | End: 2019-09-15

## 2019-09-02 RX ORDER — ASPIRIN/CALCIUM CARB/MAGNESIUM 324 MG
325 TABLET ORAL DAILY
Refills: 0 | Status: DISCONTINUED | OUTPATIENT
Start: 2019-09-02 | End: 2019-09-04

## 2019-09-02 RX ORDER — LACTOBACILLUS ACIDOPHILUS 100MM CELL
1 CAPSULE ORAL
Refills: 0 | Status: DISCONTINUED | OUTPATIENT
Start: 2019-09-02 | End: 2019-09-04

## 2019-09-02 RX ORDER — HYDROMORPHONE HYDROCHLORIDE 2 MG/ML
0.5 INJECTION INTRAMUSCULAR; INTRAVENOUS; SUBCUTANEOUS ONCE
Refills: 0 | Status: DISCONTINUED | OUTPATIENT
Start: 2019-09-02 | End: 2019-09-02

## 2019-09-02 RX ORDER — LACTOBACILLUS ACIDOPHILUS 100MM CELL
1 CAPSULE ORAL
Qty: 10 | Refills: 0
Start: 2019-09-02 | End: 2019-09-11

## 2019-09-02 RX ORDER — CEFPODOXIME PROXETIL 100 MG
1 TABLET ORAL
Qty: 16 | Refills: 0
Start: 2019-09-02 | End: 2019-09-09

## 2019-09-02 RX ORDER — ACETAMINOPHEN 500 MG
2 TABLET ORAL
Qty: 0 | Refills: 0 | DISCHARGE
Start: 2019-09-02

## 2019-09-02 RX ORDER — CEFPODOXIME PROXETIL 100 MG
200 TABLET ORAL EVERY 12 HOURS
Refills: 0 | Status: DISCONTINUED | OUTPATIENT
Start: 2019-09-02 | End: 2019-09-04

## 2019-09-02 RX ORDER — CEPHALEXIN 500 MG
1 CAPSULE ORAL
Qty: 0 | Refills: 0 | DISCHARGE

## 2019-09-02 RX ADMIN — Medication 137 MICROGRAM(S): at 06:10

## 2019-09-02 RX ADMIN — HYDROMORPHONE HYDROCHLORIDE 0.5 MILLIGRAM(S): 2 INJECTION INTRAMUSCULAR; INTRAVENOUS; SUBCUTANEOUS at 11:50

## 2019-09-02 RX ADMIN — ENOXAPARIN SODIUM 40 MILLIGRAM(S): 100 INJECTION SUBCUTANEOUS at 08:56

## 2019-09-02 RX ADMIN — Medication 1 TABLET(S): at 18:03

## 2019-09-02 RX ADMIN — Medication 1000 MILLIGRAM(S): at 08:55

## 2019-09-02 RX ADMIN — Medication 1 TABLET(S): at 08:56

## 2019-09-02 RX ADMIN — Medication 1000 MILLIGRAM(S): at 09:25

## 2019-09-02 RX ADMIN — Medication 1000 MILLIGRAM(S): at 18:00

## 2019-09-02 RX ADMIN — HYDROMORPHONE HYDROCHLORIDE 0.5 MILLIGRAM(S): 2 INJECTION INTRAMUSCULAR; INTRAVENOUS; SUBCUTANEOUS at 12:05

## 2019-09-02 RX ADMIN — PIPERACILLIN AND TAZOBACTAM 25 GRAM(S): 4; .5 INJECTION, POWDER, LYOPHILIZED, FOR SOLUTION INTRAVENOUS at 00:39

## 2019-09-02 RX ADMIN — PANTOPRAZOLE SODIUM 40 MILLIGRAM(S): 20 TABLET, DELAYED RELEASE ORAL at 06:10

## 2019-09-02 RX ADMIN — Medication 200 MILLIGRAM(S): at 08:56

## 2019-09-02 RX ADMIN — Medication 1000 MILLIGRAM(S): at 18:30

## 2019-09-02 RX ADMIN — Medication 200 MILLIGRAM(S): at 18:02

## 2019-09-02 RX ADMIN — Medication 1000 MILLIGRAM(S): at 01:10

## 2019-09-02 RX ADMIN — Medication 1000 MILLIGRAM(S): at 00:38

## 2019-09-03 ENCOUNTER — TRANSCRIPTION ENCOUNTER (OUTPATIENT)
Age: 54
End: 2019-09-03

## 2019-09-03 DIAGNOSIS — E06.3 AUTOIMMUNE THYROIDITIS: ICD-10-CM

## 2019-09-03 LAB
CULTURE RESULTS: SIGNIFICANT CHANGE UP
ORGANISM # SPEC MICROSCOPIC CNT: SIGNIFICANT CHANGE UP
ORGANISM # SPEC MICROSCOPIC CNT: SIGNIFICANT CHANGE UP
SPECIMEN SOURCE: SIGNIFICANT CHANGE UP

## 2019-09-03 PROCEDURE — 99231 SBSQ HOSP IP/OBS SF/LOW 25: CPT

## 2019-09-03 RX ORDER — ASPIRIN/CALCIUM CARB/MAGNESIUM 324 MG
1 TABLET ORAL
Qty: 28 | Refills: 0
Start: 2019-09-03 | End: 2019-09-30

## 2019-09-03 RX ORDER — HYDROMORPHONE HYDROCHLORIDE 2 MG/ML
1 INJECTION INTRAMUSCULAR; INTRAVENOUS; SUBCUTANEOUS
Qty: 42 | Refills: 0
Start: 2019-09-03 | End: 2019-09-09

## 2019-09-03 RX ADMIN — Medication 325 MILLIGRAM(S): at 12:48

## 2019-09-03 RX ADMIN — Medication 1000 MILLIGRAM(S): at 01:06

## 2019-09-03 RX ADMIN — PANTOPRAZOLE SODIUM 40 MILLIGRAM(S): 20 TABLET, DELAYED RELEASE ORAL at 05:21

## 2019-09-03 RX ADMIN — Medication 1000 MILLIGRAM(S): at 04:06

## 2019-09-03 RX ADMIN — Medication 137 MICROGRAM(S): at 05:21

## 2019-09-03 RX ADMIN — Medication 1 TABLET(S): at 08:28

## 2019-09-03 RX ADMIN — Medication 1000 MILLIGRAM(S): at 17:59

## 2019-09-03 RX ADMIN — Medication 1000 MILLIGRAM(S): at 08:55

## 2019-09-03 RX ADMIN — Medication 1000 MILLIGRAM(S): at 08:28

## 2019-09-03 RX ADMIN — Medication 200 MILLIGRAM(S): at 05:21

## 2019-09-03 RX ADMIN — Medication 1000 MILLIGRAM(S): at 17:29

## 2019-09-03 RX ADMIN — Medication 200 MILLIGRAM(S): at 17:29

## 2019-09-03 RX ADMIN — Medication 1 TABLET(S): at 17:39

## 2019-09-03 NOTE — DIETITIAN INITIAL EVALUATION ADULT. - OTHER INFO
53 yo female [place] from home after [place]- primary dx: cellulitis and laceration of LLE (s/p I&D, +wound care).  Pt seen for nutrition assessment secondary to LOS policy (</7 days since adm); pt on regular diet throughout week c reported adequate appetite and intake, tolerating consistency well. NKFA.  Denies nutrition related questions at time of visit. Pending d/c home once home care set up.

## 2019-09-03 NOTE — DIETITIAN INITIAL EVALUATION ADULT. - NUTRITION DIAGNOSITC TERMINOLOGY #1
Deidre Jaymeshe   MRN: [id number]    Department:  [facility] Emergency Department   Date of Visit:  1/16/2019           Disclosure     Insurance plans vary and the physician(s) referred by the ER may not be covered by your plan.  Please contact your i tell this physician (or your personal doctor if your instructions are to return to your personal doctor) about any new or lasting problems. The primary care or specialist physician will see patients referred from the [facility] Emergency Department.  Slim Harman Increased Nutrient Needs...

## 2019-09-03 NOTE — DISCHARGE NOTE PROVIDER - PROVIDER TOKENS
PROVIDER:[TOKEN:[[id number]],FOLLOWUP:[1 week]] PROVIDER:[TOKEN:[[id number]],FOLLOWUP:[1 week]],PROVIDER:[TOKEN:[[id number]],FOLLOWUP:[1 week]]

## 2019-09-03 NOTE — DISCHARGE NOTE PROVIDER - NSDCFUADDINST_GEN_ALL_CORE_FT
Call Dr. Artemio Dominique (Plastic Surgeon) for appointment [phone] Call Dr. Artemio Dominique (Plastic Surgeon) for appointment [phone] for next week.

## 2019-09-03 NOTE — DISCHARGE NOTE PROVIDER - NSDCCPTREATMENT_GEN_ALL_CORE_FT
PRINCIPAL PROCEDURE  Procedure: Irrigation and debridement of left tibia  Findings and Treatment: deep infection

## 2019-09-03 NOTE — DISCHARGE NOTE PROVIDER - HOSPITAL COURSE
This patient was admitted to [facility], through the ED on 8/29/19, with a history of infected deep laceration to left distal tibia.  Patient was seen and treated by Dr. Oc Monteiro several days prior, but her condition worsened on 8/29/19 and patient was instructed to go to the ED. She was seen by the hospitalist and was medically cleared to undergo procedure. Patient underwent I & D left distal tibia with placement of wound vac, by Dr. Oc Monteiro on 8/30/19. Procedure was well tolerated.  No operative or trini-operative complications arose during patients hospital course.  Patient received antibiotic according to culture & sensitivity for infection control.  Lovenox 40 mg SQ daily was given for DVT prophylaxis initially and patient was changed to Aspirin 325mg daily on 9/2/19.  Anesthesia, Infectious Disease, Medical Hospitalist, and Physical Therapy were consulted. Patient is stable for discharge with a good prognosis.  Appropriate discharge instructions and medications are provided in this document.

## 2019-09-03 NOTE — DISCHARGE NOTE PROVIDER - NSDCCPCAREPLAN_GEN_ALL_CORE_FT
PRINCIPAL DISCHARGE DIAGNOSIS  Diagnosis: Cellulitis of leg, left  Assessment and Plan of Treatment: WBAT, wound vac per home care, antibiotics

## 2019-09-03 NOTE — DISCHARGE NOTE PROVIDER - INSTRUCTIONS
regular diet regular diet  Pain medicine has been prescribed for you, as needed, and it often causes constipation.    For Constipation :   • Increase your water intake. Drink at least 8 glasses of water daily.  • Try adding fiber to your diet by eating fruits, vegetables and foods that are rich in grains.  • If you do experience constipation, you may take an over-the-counter stool softener/laxative such as Colace, Senokot or  Milk of Magnesia.

## 2019-09-03 NOTE — DISCHARGE NOTE PROVIDER - CARE PROVIDER_API CALL
Oc Monteiro)  Orthopaedic Surgery  [address] [place] [address]  Phone: [phone]  Fax: [phone]  Follow Up Time: 1 week Oc Monteiro)  Orthopaedic Surgery  [address] [place] [address]  Phone: [phone]  Fax: [phone]  Follow Up Time: 1 week    Artemio Dominique)  Plastic Surgery  [address] [place] [id number]  Phone: [phone]  Fax: [phone]  Follow Up Time: 1 week

## 2019-09-03 NOTE — PROGRESS NOTE ADULT - ASSESSMENT
55 yo F with hx of Partial thyroidectomy c/b hypothyroidism,  s/p back fusion ,  admitted for evaluation of  infection in her left lower leg. Recently in [facility]. She was treated for lacerations and discharged with outpatient followup . Now in [facility] for increased swelling, pain and discharge in the left lower leg from likely a fall from a ladder. Cultures obtained in outpatient office. Now admitted for treatment and I+D of the left leg with new found Foreign body.    # Cellulitis and Laceration of LLE  s/p I/D via ortho  id following  s/p vanco and zosyn : off vanco:: now changed to vantin for 10 days treatment.   preliminary cx positive for gram negative rods  wound vac  Dispo planning, patient medically cleared as long as patient receives wound vac to take home.   Needs home services for wound vac dressing changes.     # hx of Partial Thyroidectomy now with hypothyroidism 	  - Synthroid 137 mcg.     #DVT prophylaxis   will change to lovenox

## 2019-09-04 VITALS
OXYGEN SATURATION: 96 % | RESPIRATION RATE: 18 BRPM | TEMPERATURE: 98 F | HEART RATE: 68 BPM | SYSTOLIC BLOOD PRESSURE: 128 MMHG | DIASTOLIC BLOOD PRESSURE: 77 MMHG

## 2019-09-04 LAB
CULTURE RESULTS: SIGNIFICANT CHANGE UP
CULTURE RESULTS: SIGNIFICANT CHANGE UP
ORGANISM # SPEC MICROSCOPIC CNT: SIGNIFICANT CHANGE UP
SPECIMEN SOURCE: SIGNIFICANT CHANGE UP
SPECIMEN SOURCE: SIGNIFICANT CHANGE UP

## 2019-09-04 PROCEDURE — 97110 THERAPEUTIC EXERCISES: CPT

## 2019-09-04 PROCEDURE — 80053 COMPREHEN METABOLIC PANEL: CPT

## 2019-09-04 PROCEDURE — 97530 THERAPEUTIC ACTIVITIES: CPT

## 2019-09-04 PROCEDURE — 85730 THROMBOPLASTIN TIME PARTIAL: CPT

## 2019-09-04 PROCEDURE — 86901 BLOOD TYPING SEROLOGIC RH(D): CPT

## 2019-09-04 PROCEDURE — 90471 IMMUNIZATION ADMIN: CPT

## 2019-09-04 PROCEDURE — 97161 PT EVAL LOW COMPLEX 20 MIN: CPT

## 2019-09-04 PROCEDURE — 80202 ASSAY OF VANCOMYCIN: CPT

## 2019-09-04 PROCEDURE — 93005 ELECTROCARDIOGRAM TRACING: CPT

## 2019-09-04 PROCEDURE — 88304 TISSUE EXAM BY PATHOLOGIST: CPT

## 2019-09-04 PROCEDURE — 87070 CULTURE OTHR SPECIMN AEROBIC: CPT

## 2019-09-04 PROCEDURE — 85610 PROTHROMBIN TIME: CPT

## 2019-09-04 PROCEDURE — 85027 COMPLETE CBC AUTOMATED: CPT

## 2019-09-04 PROCEDURE — 86850 RBC ANTIBODY SCREEN: CPT

## 2019-09-04 PROCEDURE — 99285 EMERGENCY DEPT VISIT HI MDM: CPT | Mod: 25

## 2019-09-04 PROCEDURE — 86900 BLOOD TYPING SEROLOGIC ABO: CPT

## 2019-09-04 PROCEDURE — 85652 RBC SED RATE AUTOMATED: CPT

## 2019-09-04 PROCEDURE — 36415 COLL VENOUS BLD VENIPUNCTURE: CPT

## 2019-09-04 PROCEDURE — 96372 THER/PROPH/DIAG INJ SC/IM: CPT

## 2019-09-04 PROCEDURE — 87186 SC STD MICRODIL/AGAR DIL: CPT

## 2019-09-04 PROCEDURE — 86140 C-REACTIVE PROTEIN: CPT

## 2019-09-04 PROCEDURE — 96375 TX/PRO/DX INJ NEW DRUG ADDON: CPT

## 2019-09-04 PROCEDURE — 90715 TDAP VACCINE 7 YRS/> IM: CPT

## 2019-09-04 PROCEDURE — 71045 X-RAY EXAM CHEST 1 VIEW: CPT

## 2019-09-04 PROCEDURE — 99233 SBSQ HOSP IP/OBS HIGH 50: CPT

## 2019-09-04 PROCEDURE — 97116 GAIT TRAINING THERAPY: CPT

## 2019-09-04 PROCEDURE — 93970 EXTREMITY STUDY: CPT

## 2019-09-04 PROCEDURE — 81003 URINALYSIS AUTO W/O SCOPE: CPT

## 2019-09-04 PROCEDURE — 84702 CHORIONIC GONADOTROPIN TEST: CPT

## 2019-09-04 PROCEDURE — 80048 BASIC METABOLIC PNL TOTAL CA: CPT

## 2019-09-04 PROCEDURE — 96374 THER/PROPH/DIAG INJ IV PUSH: CPT

## 2019-09-04 PROCEDURE — 84145 PROCALCITONIN (PCT): CPT

## 2019-09-04 RX ORDER — HYDROMORPHONE HYDROCHLORIDE 2 MG/ML
0.5 INJECTION INTRAMUSCULAR; INTRAVENOUS; SUBCUTANEOUS ONCE
Refills: 0 | Status: DISCONTINUED | OUTPATIENT
Start: 2019-09-04 | End: 2019-09-04

## 2019-09-04 RX ADMIN — Medication 1000 MILLIGRAM(S): at 09:00

## 2019-09-04 RX ADMIN — Medication 137 MICROGRAM(S): at 06:12

## 2019-09-04 RX ADMIN — Medication 200 MILLIGRAM(S): at 06:12

## 2019-09-04 RX ADMIN — Medication 1000 MILLIGRAM(S): at 08:30

## 2019-09-04 RX ADMIN — Medication 1 TABLET(S): at 08:30

## 2019-09-04 RX ADMIN — HYDROMORPHONE HYDROCHLORIDE 0.5 MILLIGRAM(S): 2 INJECTION INTRAMUSCULAR; INTRAVENOUS; SUBCUTANEOUS at 11:20

## 2019-09-04 RX ADMIN — PANTOPRAZOLE SODIUM 40 MILLIGRAM(S): 20 TABLET, DELAYED RELEASE ORAL at 06:12

## 2019-09-04 RX ADMIN — Medication 325 MILLIGRAM(S): at 12:47

## 2019-09-04 RX ADMIN — HYDROMORPHONE HYDROCHLORIDE 0.5 MILLIGRAM(S): 2 INJECTION INTRAMUSCULAR; INTRAVENOUS; SUBCUTANEOUS at 10:58

## 2019-09-04 NOTE — PROGRESS NOTE ADULT - PROVIDER SPECIALTY LIST ADULT
Hospitalist
Infectious Disease
Internal Medicine
Orthopedics
Infectious Disease
Orthopedics

## 2019-09-04 NOTE — PROGRESS NOTE ADULT - REASON FOR ADMISSION
Infection Left lower leg

## 2019-09-04 NOTE — PROGRESS NOTE ADULT - ASSESSMENT
53 yo F with hx of Partial thyroidectomy c/b hypothyroidism,  s/p back fusion ,  admitted for evaluation of  infection in her left lower leg. Recently in [facility]. She was treated for lacerations and discharged with outpatient followup . Now in [facility] for increased swelling, pain and discharge in the left lower leg from likely a fall from a ladder. Cultures obtained in outpatient office. Now admitted for treatment and I+D of the left leg with new found Foreign body.    # Cellulitis and Laceration of LLE  s/p I/D via ortho  id following  s/p vanco and zosyn : off vanco:: now changed to vantin for 10 days treatment.   preliminary cx positive for gram negative rods  wound vac  Dispo planning, patient medically cleared for d/c home    # hx of Partial Thyroidectomy now with hypothyroidism 	  - Synthroid 137 mcg.     #DVT prophylaxis   asa 325mg daily    #Disposition  discharge home

## 2019-09-04 NOTE — PROGRESS NOTE ADULT - SUBJECTIVE AND OBJECTIVE BOX
ID progress note     Name: MYKE SONI  Age: 54y  Gender: Female  MRN: [id number]    Interval History-- Events noted, s/p I & D of left leg wound and removal of FB from left foot , pod # 1 , she has wound vac in place . Still with pain   Notes reviewed    Past Medical History--  Hypothyroidism  H/O left knee surgery  History of elbow surgery  History of partial thyroidectomy  History of carpal tunnel surgery of right wrist  History of spinal surgery  S/P partial hysterectomy  History of laparoscopic cholecystectomy  No significant past surgical history      For details regarding the patient's social history, family history, and other miscellaneous elements, please refer the initial infectious diseases consultation and/or the admitting history and physical examination for this admission.    Allergies--  Allergies    Levaquin (Unknown)  morphine (Unknown)  niacin (Urticaria)    Intolerances        Medications--  Antibiotics:  piperacillin/tazobactam IVPB.. 3.375 Gram(s) IV Intermittent every 8 hours  vancomycin  IVPB 1250 milliGRAM(s) IV Intermittent every 12 hours    Immunologic:    Other:  acetaminophen   Tablet ..  enoxaparin Injectable  lactated ringers.  levothyroxine  oxyCODONE    IR PRN  oxyCODONE    IR PRN  pantoprazole    Tablet      Review of Systems--  A 10-point review of systems was obtained.     Pertinent positives and negatives--  Constitutional: No fevers. No Chills. No Rigors.   Cardiovascular: No chest pain. No palpitations.  Respiratory: No shortness of breath. No cough.  Gastrointestinal: No nausea or vomiting. No diarrhea or constipation.   Psychiatric: Pleasant. Appropriate affect.    Review of systems otherwise negative except as previously noted.    Physical Examination--  Vital Signs: T(F): 97.7 (08-31-19 @ 05:00), Max: 98.4 (08-30-19 @ 14:50)  HR: 88 (08-31-19 @ 05:00)  BP: 107/55 (08-31-19 @ 05:00)  RR: 16 (08-31-19 @ 05:00)  SpO2: 98% (08-31-19 @ 05:00)  Wt(kg): --  General: Nontoxic-appearing Female in no acute distress.  HEENT: AT/NC. PERRL. EOMI. Anicteric. Conjunctiva pink and moist. Oropharynx clear. Dentition fair.  Neck: Not rigid. No sense of mass.  Nodes: None palpable.  Lungs: Clear bilaterally without rales, wheezing or rhonchi  Heart: Regular rate and rhythm. No Murmur. No rub. No gallop. No palpable thrill.  Abdomen: Bowel sounds present and normoactive. Soft. Nondistended. Nontender. No sense of mass. No organomegaly.  Back: No spinal tenderness. No costovertebral angle tenderness.   Extremities: No cyanosis or clubbing. No edema. left leg - post op dressing / ace wrap in place with wound vac . Right calf hematoma   Skin: Warm. Dry. Good turgor. No rash. No vasculitic stigmata.  Psychiatric: Appropriate affect and mood for situation.         Laboratory Studies--  CBC                        12.3   10.51 )-----------( 396      ( 31 Aug 2019 07:09 )             38.3       Chemistries  08-31    138  |  102  |  9   ----------------------------<  138<H>  4.1   |  26  |  0.75    Ca    9.2      31 Aug 2019 07:04    TPro  8.0  /  Alb  3.8  /  TBili  0.4  /  DBili  x   /  AST  33  /  ALT  84<H>  /  AlkPhos  173<H>  08-29    Sedimentation Rate, Erythrocyte (08.30.19 @ 12:05)    Sedimentation Rate, Erythrocyte: 36 mm/Hr    Procalcitonin, Serum (08.30.19 @ 15:40)    Procalcitonin, Serum: <0.02:     C-Reactive Protein, Serum (08.30.19 @ 15:40)    C-Reactive Protein, Serum: 1.46 mg/dL          Culture Data    Culture - Tissue with Gram Stain (collected 30 Aug 2019 21:19)  Source: .Tissue  Gram Stain (31 Aug 2019 03:38):    No polymorphonuclear leukocytes seen per low power field    No organisms seen per oil power field          RADIOLOGY:  US Duplex Venous Lower Ext Complete, Bilateral (08.29.19 @ 17:40) >  FINDINGS:  There is normal compressibility of the bilateral common femoral, femoral   and popliteal veins.     Doppler examination shows normal spontaneous and phasic flow.    No calf vein thrombosis is detected.    IMPRESSION:   No evidence of deep venous thrombosis in either lower extremity.    Xray Tibia + Fibula 2 Views, Bilateral (08.20.19 @ 22:50) >  Bilateral tib-fibs and Foot     Left tib-fib. 2 views.    There is a quite minimal metallic type density superimposing with the   anterior tibial plateau.    Bony structures are unremarkable.    Right tib-fib. 2 views.    Bony structures are unremarkable.    Bilateral ankles.    Left ankle. 3 views obtained.    There is a minimal inferior calcaneal spur. Otherwise no bony finding.    Right ankle. 3 views obtained. Right ankle appearance is symmetric to   left ankle appearance.    Left foot. 3 views obtained.    There is a fracture of the proximal a corner of the proximal phalanx of   the great toe. There is mild first MTP degeneration.    IMPRESSION: Left great toe fracture as above. Incidental findings as   above.      Assessment :   54 year old female admitted with possible infected deep laceration left calf following a fall from boat ladder on 8/20/19 , she probably has underlying hematoma . She has been taking PO Keflex x last 3 days . She could have polymicrobial infection due to exposure to salt sea water . She also has a possible right calf hematoma from soft tissue injury to right calf muscle     Plan :   - Called [facility]  to get results of the cs sent from Dr. sorensen's office    - continue with Zosyn 3.375 grams q 8 hours and  Vancomycin 1250 q 12 hours   - follow  deep tissue cs from OR for left leg wound   - elevate and ice compresses to right calf   - local wound care as per ortho  - DC IVF   - Pain control     Continue with present regime .  Appropriate use of antibiotics and adverse effects reviewed.    I have discussed the above plan of care with patient in detail. She expressed understanding of the treatment plan . Risks, benefits and alternatives discussed in detail. I have asked if she has  any questions or concerns and appropriately addressed them to the best of my ability .      > 35 minutes spent in direct patient care reviewing  the notes, lab data/ imaging , discussion with multidisciplinary team. All questions were addressed and answered to the best of my capacity .    Thank you for allowing me to participate in the care of your patient .        Roberto Santizo MD  [phone]
ORTHOPEDIC PA PROGRESS NOTE  MYKE CRISTIANEPAUL      54y Female                                                                                                                               POD #1        STATUS POST:               Pre-Op Dx: Foreign body in foot, left  Open wound: left tibia  Wound, open: left tibia    Post-Op Dx:  Foreign body in left foot  Open wound: left tibia    Procedure: Incision and drainage of left tibia  Removal of superficial foreign body from foot                                                Pain (0-10):   Current Pain Management:  [ ] PCA   [x ] Po Analgesics [ ] IM /IV Anagesics     T(F): 98.3  HR: 60  BP: 106/66  RR: 17  SpO2: 98%                        12.3   10.51 )-----------( 396      ( 31 Aug 2019 07:09 )             38.3                     08-31    138  |  102  |  9   ----------------------------<  138<H>  4.1   |  26  |  0.75    Ca    9.2      31 Aug 2019 07:04    TPro  8.0  /  Alb  3.8  /  TBili  0.4  /  DBili  x   /  AST  33  /  ALT  84<H>  /  AlkPhos  173<H>  08-29    Physical Exam :    -   Dressing sterile.  Wound vac in place   -   Distal Neurvascular status intact grossly.   -   Warm well perfused; capillary refill <3 seconds   -   (+) Sensation to light touch      A/P: 54y Female s/p Foreign body in left foot  Open wound: left tibia    -   Ortho Stable  -   Pain control   -   Medicine to follow  -   DVT ppx:     [x ]SCDs     [ ] ASA     [ ] Eliquis     [x ] Lovenox  -   Weight bearing status:  WBAT [x ]        PWB    [ ]     TTWB  [ ]      NWB  [ ] With walker, flat foot on cast shoe, be mindful of woundvac   -  Dispo:     Home [ ]     Acute Rehab [ ]     [facility] [ ]     TBD [x ]  -  ID: As per Dr. Santizo, cultures pending.  Continue vanco & zosyn until cultures are back
ID progress note     Name: MYKE SONI  Age: 54y  Gender: Female  MRN: [id number]    Interval History-- Events noted, doing well, orthopedic follow up noted. Much improved right calf swelling. Improved pain left leg   s/p I & D of left leg wound and removal of FB from left foot , pod # 3 , Intra op cs noted   Notes reviewed    Past Medical History--  Hypothyroidism  H/O left knee surgery  History of elbow surgery  History of partial thyroidectomy  History of carpal tunnel surgery of right wrist  History of spinal surgery  S/P partial hysterectomy  History of laparoscopic cholecystectomy  No significant past surgical history      For details regarding the patient's social history, family history, and other miscellaneous elements, please refer the initial infectious diseases consultation and/or the admitting history and physical examination for this admission.    Allergies--  Allergies    Levaquin (Unknown)  morphine (Unknown)  niacin (Urticaria)    Intolerances        Medications--  Antibiotics:  piperacillin/tazobactam IVPB.. 3.375 Gram(s) IV Intermittent every 8 hours    Immunologic:    Other:  acetaminophen   Tablet ..  enoxaparin Injectable  levothyroxine  oxyCODONE    IR PRN  oxyCODONE    IR PRN  pantoprazole    Tablet      Review of Systems--  A 10-point review of systems was obtained.     Pertinent positives and negatives--  Constitutional: No fevers. No Chills. No Rigors.   Cardiovascular: No chest pain. No palpitations.  Respiratory: No shortness of breath. No cough.  Gastrointestinal: No nausea or vomiting. No diarrhea or constipation.   Psychiatric: Pleasant. Appropriate affect.    Review of systems otherwise negative except as previously noted.    Physical Examination--  Vital Signs: T(F): 97.6 (09-02-19 @ 05:32), Max: 98.4 (09-01-19 @ 17:09)  HR: 62 (09-02-19 @ 05:32)  BP: 115/69 (09-02-19 @ 05:32)  RR: 18 (09-02-19 @ 05:32)  SpO2: 95% (09-02-19 @ 05:32)  Wt(kg): --    General: Nontoxic-appearing Female in no acute distress.  HEENT: AT/NC. PERRL. EOMI. Anicteric. Conjunctiva pink and moist. Oropharynx clear. Dentition fair.  Neck: Not rigid. No sense of mass.  Nodes: None palpable.  Lungs: Clear bilaterally without rales, wheezing or rhonchi  Heart: Regular rate and rhythm. No Murmur. No rub. No gallop. No palpable thrill.  Abdomen: Bowel sounds present and normoactive. Soft. Nondistended. Nontender. No sense of mass. No organomegaly.  Back: No spinal tenderness. No costovertebral angle tenderness.   Extremities: No cyanosis or clubbing. No edema. left leg- dressing intact, dry , decreased tenderness and erythema  , wound vac in place Right calf hematoma   Skin: Warm. Dry. Good turgor. No rash. No vasculitic stigmata.  Psychiatric: Appropriate affect and mood for situation.     Laboratory Studies--  CBC                        10.9   5.89  )-----------( 333      ( 02 Sep 2019 07:12 )             34.2       Chemistries  09-01    142  |  105  |  14  ----------------------------<  96  4.0   |  30  |  0.94    Ca    9.1      01 Sep 2019 07:55      RECENT CULTURES    Culture - Surgical Swab (08.30.19 @ 21:19)    -  Amikacin: S <=16    -  Amikacin: S <=16    -  Amoxicillin/Clavulanic Acid: R >16/8    -  Ampicillin: R >16 These ampicillin results predict results for amoxicillin    -  Ampicillin/Sulbactam: I 16/8    -  Ampicillin/Sulbactam: I 16/8 Enterobacter, Citrobacter, and Serratia may develop resistance during prolonged therapy (3-4 days)    -  Aztreonam: S <=4    -  Aztreonam: S <=4    -  Cefazolin: R >16    -  Cefazolin: R >16 Enterobacter, Citrobacter, and Serratia may develop resistance during prolonged therapy (3-4 days)    -  Cefepime: S <=2    -  Cefepime: S <=2    -  Cefoxitin: R >16    -  Cefoxitin: R >16    -  Ceftazidime: S <=1    -  Ceftriaxone: S <=1    -  Ceftriaxone: S <=1 Enterobacter, Citrobacter, and Serratia may develop resistance during prolonged therapy    -  Ciprofloxacin: S <=1    -  Ciprofloxacin: S <=1    -  Ertapenem: S <=0.5    -  Gentamicin: S <=2    -  Gentamicin: S <=2    -  Levofloxacin: S <=2    -  Levofloxacin: S <=2    -  Meropenem: R >8    -  Meropenem: S <=1    -  Piperacillin/Tazobactam: S <=8    -  Piperacillin/Tazobactam: S <=8    -  Tobramycin: S <=2    -  Trimethoprim/Sulfamethoxazole: S <=2/38    -  Trimethoprim/Sulfamethoxazole: S <=2/38    Specimen Source: .Surgical Swab    Culture Results:   Moderate Aeromonas hydrophila  Moderate Citrobacter braakii    Organism Identification: Aeromonas hydrophila  Citrobacter braakii    Organism: Aeromonas hydrophila    Organism: Citrobacter braakii    Method Type: TYRON    Method Type: TYRON        Culture - Tissue with Gram Stain (collected 30 Aug 2019 21:19)  Source: .Tissue  Gram Stain (31 Aug 2019 03:38):    No polymorphonuclear leukocytes seen per low power field    No organisms seen per oil power field  Preliminary Report (01 Sep 2019 20:36):    Few Citrobacter freundii complex  Organism: Citrobacter freundii complex (01 Sep 2019 20:36)  Organism: Citrobacter freundii complex (01 Sep 2019 20:36)    Culture - Surgical Swab (collected 30 Aug 2019 21:19)  Source: .Surgical Swab  Preliminary Report (01 Sep 2019 20:32):    Few Citrobacter freundii complex  Organism: Citrobacter freundii complex (01 Sep 2019 20:31)  Organism: Citrobacter freundii complex (01 Sep 2019 20:31)        RADIOLOGY:  US Duplex Venous Lower Ext Complete, Bilateral (08.29.19 @ 17:40) >  FINDINGS:  There is normal compressibility of the bilateral common femoral, femoral   and popliteal veins.     Doppler examination shows normal spontaneous and phasic flow.    No calf vein thrombosis is detected.    IMPRESSION:   No evidence of deep venous thrombosis in either lower extremity.    Xray Tibia + Fibula 2 Views, Bilateral (08.20.19 @ 22:50) >  Bilateral tib-fibs and Foot     Left tib-fib. 2 views.    There is a quite minimal metallic type density superimposing with the   anterior tibial plateau.    Bony structures are unremarkable.    Right tib-fib. 2 views.    Bony structures are unremarkable.    Bilateral ankles.    Left ankle. 3 views obtained.    There is a minimal inferior calcaneal spur. Otherwise no bony finding.    Right ankle. 3 views obtained. Right ankle appearance is symmetric to   left ankle appearance.    Left foot. 3 views obtained.    There is a fracture of the proximal a corner of the proximal phalanx of   the great toe. There is mild first MTP degeneration.    IMPRESSION: Left great toe fracture as above. Incidental findings as   above.      Assessment :   54 year old female admitted with possible infected deep laceration left calf following a fall from boat ladder on 8/20/19 , she probably has underlying hematoma . She has been taking PO Keflex x last 3 days . She could have polymicrobial infection due to exposure to salt sea water . She also has a possible right calf hematoma from soft tissue injury to right calf muscle . She is s/p I & D of the leg leg wound and removal of FB left foot .Clinically has improved   wound cs noted, she has allergies to levaquin.   Plan :   -  will change to po Vantin 200 mg bid x 10 days   - DC planning as per orthopedics   - needs VNS and wound vac set up   - elevate and ice compresses to right calf   - local wound care as per ortho  - DC IVF   - Pain control     Continue with present regime .  Appropriate use of antibiotics and adverse effects reviewed.    I have discussed the above plan of care with patient in detail. She expressed understanding of the treatment plan . Risks, benefits and alternatives discussed in detail. I have asked if she has any questions or concerns and appropriately addressed them to the best of my ability .      > 35 minutes spent in direct patient care reviewing  the notes, lab data/ imaging , discussion with multidisciplinary team. All questions were addressed and answered to the best of my capacity .    Thank you for allowing me to participate in the care of your patient .        Roberto Santizo MD  [phone]
ID progress note     Name: MYKE SONI  Age: 54y  Gender: Female  MRN: [id number]    Interval History-- Events noted, afebrile , no new complaints . s/p I & D of left leg wound and removal of FB from left foot , pod # 2 , Intra op cs noted . Out patient cs from 8/29 showed numerous E coli   Notes reviewed    Past Medical History--  Hypothyroidism  H/O left knee surgery  History of elbow surgery  History of partial thyroidectomy  History of carpal tunnel surgery of right wrist  History of spinal surgery  S/P partial hysterectomy  History of laparoscopic cholecystectomy  No significant past surgical history      For details regarding the patient's social history, family history, and other miscellaneous elements, please refer the initial infectious diseases consultation and/or the admitting history and physical examination for this admission.    Allergies--  Allergies    Levaquin (Unknown)  morphine (Unknown)  niacin (Urticaria)    Intolerances        Medications--  Antibiotics:  piperacillin/tazobactam IVPB.. 3.375 Gram(s) IV Intermittent every 8 hours  vancomycin  IVPB 1250 milliGRAM(s) IV Intermittent every 12 hours    Immunologic:    Other:  acetaminophen   Tablet ..  enoxaparin Injectable  levothyroxine  oxyCODONE    IR PRN  oxyCODONE    IR PRN  pantoprazole    Tablet      Review of Systems--  A 10-point review of systems was obtained.     Pertinent positives and negatives--  Constitutional: No fevers. No Chills. No Rigors.   Cardiovascular: No chest pain. No palpitations.  Respiratory: No shortness of breath. No cough.  Gastrointestinal: No nausea or vomiting. No diarrhea or constipation.   Psychiatric: Pleasant. Appropriate affect.    Review of systems otherwise negative except as previously noted.    Physical Examination--  Vital Signs: T(F): 98 (09-01-19 @ 05:15), Max: 98.5 (08-31-19 @ 21:48)  HR: 58 (09-01-19 @ 05:15)  BP: 107/66 (09-01-19 @ 05:15)  RR: 16 (09-01-19 @ 05:15)  SpO2: 96% (09-01-19 @ 05:15)  Wt(kg): --  General: Nontoxic-appearing Female in no acute distress.  HEENT: AT/NC. PERRL. EOMI. Anicteric. Conjunctiva pink and moist. Oropharynx clear. Dentition fair.  Neck: Not rigid. No sense of mass.  Nodes: None palpable.  Lungs: Clear bilaterally without rales, wheezing or rhonchi  Heart: Regular rate and rhythm. No Murmur. No rub. No gallop. No palpable thrill.  Abdomen: Bowel sounds present and normoactive. Soft. Nondistended. Nontender. No sense of mass. No organomegaly.  Back: No spinal tenderness. No costovertebral angle tenderness.   Extremities: No cyanosis or clubbing. No edema. left leg- dressing intact, dry  , wound vac in place Right calf hematoma   Skin: Warm. Dry. Good turgor. No rash. No vasculitic stigmata.  Psychiatric: Appropriate affect and mood for situation.         Laboratory Studies--  CBC                        11.2   6.77  )-----------( 367      ( 01 Sep 2019 07:55 )             35.6       Chemistries  08-31    138  |  102  |  9   ----------------------------<  138<H>  4.1   |  26  |  0.75    Ca    9.2      31 Aug 2019 07:04        Culture Data    Culture - Surgical Swab (collected 30 Aug 2019 21:19)  Source: .Surgical Swab  Preliminary Report (31 Aug 2019 22:50):    Moderate Gram Negative Rods    Culture - Tissue with Gram Stain (collected 30 Aug 2019 21:19)  Source: .Tissue  Gram Stain (31 Aug 2019 03:38):    No polymorphonuclear leukocytes seen per low power field    No organisms seen per oil power field  Preliminary Report (31 Aug 2019 22:59):    Few Gram Negative Rods    Culture - Surgical Swab (collected 30 Aug 2019 21:19)  Source: .Surgical Swab  Preliminary Report (31 Aug 2019 22:51):    Few Gram Negative Rods        RADIOLOGY:   structures are unremarkable.    Bilateral ankles.    Left ankle. 3 views obtained.    There is a minimal inferior calcaneal spur. Otherwise no bony finding.    Right ankle. 3 views obtained. Right ankle appearance is symmetric to   left ankle appearance.    Left foot. 3 views obtained.    There is a fracture of the proximal a corner of the proximal phalanx of   the great toe. There is mild first MTP degeneration.    IMPRESSION: Left great toe fracture as above. Incidental findings as   above.      Assessment :   54 year old female admitted with possible infected deep laceration left calf following a fall from boat ladder on 8/20/19 , she probably has underlying hematoma . She has been taking PO Keflex x last 3 days . She could have polymicrobial infection due to exposure to salt sea water . She also has a possible right calf hematoma from soft tissue injury to right calf muscle   She is s/p I & D of left leg wound and removal of FB from left foot , pod # 2 , she has wound vac in place   Plan :      - continue with Zosyn 3.375 grams q 8 hours and  DC Vancomycin as no gram positive in wound cs   - follow  final  tissue cs from OR for left leg wound   - elevate and ice compresses to right calf   - local wound care as per ortho  - DC IVF   - Pain control     Continue with present regime .  Appropriate use of antibiotics and adverse effects reviewed.    I have discussed the above plan of care with patient in detail. She expressed understanding of the treatment plan . Risks, benefits and alternatives discussed in detail. I have asked if she has  any questions or concerns and appropriately addressed them to the best of my ability .      > 35 minutes spent in direct patient care reviewing  the notes, lab data/ imaging , discussion with multidisciplinary team. All questions were addressed and answered to the best of my capacity .    Thank you for allowing me to participate in the care of your patient .        Roberto Santizo MD  [phone]
ID progress note     Name: MYKE SONI  Age: 54y  Gender: Female  MRN: [id number]    Interval History-- Events noted, doing well, awaiting home care set up   Notes reviewed    Past Medical History--  Hypothyroidism  H/O left knee surgery  History of elbow surgery  History of partial thyroidectomy  History of carpal tunnel surgery of right wrist  History of spinal surgery  S/P partial hysterectomy  History of laparoscopic cholecystectomy  No significant past surgical history      For details regarding the patient's social history, family history, and other miscellaneous elements, please refer the initial infectious diseases consultation and/or the admitting history and physical examination for this admission.    Allergies--  Allergies    Levaquin (Unknown)  morphine (Unknown)  niacin (Urticaria)    Intolerances        Medications--  Antibiotics:  cefpodoxime 200 milliGRAM(s) Oral every 12 hours    Immunologic:    Other:  acetaminophen   Tablet ..  aspirin  lactobacillus acidophilus  levothyroxine  oxyCODONE    IR PRN  oxyCODONE    IR PRN  pantoprazole    Tablet      Review of Systems--  A 10-point review of systems was obtained.     Pertinent positives and negatives--  Constitutional: No fevers. No Chills. No Rigors.   Cardiovascular: No chest pain. No palpitations.  Respiratory: No shortness of breath. No cough.  Gastrointestinal: No nausea or vomiting. No diarrhea or constipation.   Psychiatric: Pleasant. Appropriate affect.    Review of systems otherwise negative except as previously noted.    Physical Examination--  Vital Signs: T(F): 97.9 (09-04-19 @ 05:30), Max: 98.4 (09-03-19 @ 13:35)  HR: 59 (09-04-19 @ 05:30)  BP: 129/77 (09-04-19 @ 05:30)  RR: 18 (09-04-19 @ 05:30)  SpO2: 96% (09-04-19 @ 05:30)  Wt(kg): --  General: Nontoxic-appearing Female in no acute distress.  HEENT: AT/NC. PERRL. EOMI. Anicteric. Conjunctiva pink and moist. Oropharynx clear. Dentition fair.  Neck: Not rigid. No sense of mass.  Nodes: None palpable.  Lungs: Clear bilaterally without rales, wheezing or rhonchi  Heart: Regular rate and rhythm. No Murmur. No rub. No gallop. No palpable thrill.  Abdomen: Bowel sounds present and normoactive. Soft. Nondistended. Nontender. No sense of mass. No organomegaly.  Back: No spinal tenderness. No costovertebral angle tenderness.   Extremities: No cyanosis or clubbing. No edema. left leg- wound vac in place , mild tibial shin tenderness , Right calf- swelling +   Skin: Warm. Dry. Good turgor. No rash. No vasculitic stigmata.  Psychiatric: Appropriate affect and mood for situation.         Laboratory Studies--  CBC      Chemistries          Culture Data    Culture - Surgical Swab (collected 30 Aug 2019 21:19)  Source: .Surgical Swab  Preliminary Report (02 Sep 2019 00:25):    Moderate Aeromonas hydrophila    Moderate Citrobacter braakii  Organism: Aeromonas hydrophila  Citrobacter braakii (02 Sep 2019 00:25)  Organism: Citrobacter braakii (02 Sep 2019 00:25)  Organism: Aeromonas hydrophila (01 Sep 2019 23:53)    Culture - Tissue with Gram Stain (collected 30 Aug 2019 21:19)  Source: .Tissue  Gram Stain (31 Aug 2019 03:38):    No polymorphonuclear leukocytes seen per low power field    No organisms seen per oil power field  Preliminary Report (01 Sep 2019 20:36):    Few Citrobacter freundii complex  Organism: Citrobacter freundii complex (01 Sep 2019 20:36)  Organism: Citrobacter freundii complex (01 Sep 2019 20:36)    Culture - Surgical Swab (collected 30 Aug 2019 21:19)  Source: .Surgical Swab  Final Report (03 Sep 2019 23:02):    Few Citrobacter freundii complex    Rare Clostridium perfringens "Susceptibilities not performed"  Organism: Citrobacter freundii complex (03 Sep 2019 23:02)  Organism: Citrobacter freundii complex (03 Sep 2019 23:02)      RADIOLOGY:  US Duplex Venous Lower Ext Complete, Bilateral (08.29.19 @ 17:40) >  FINDINGS:  There is normal compressibility of the bilateral common femoral, femoral   and popliteal veins.     Doppler examination shows normal spontaneous and phasic flow.    No calf vein thrombosis is detected.    IMPRESSION:   No evidence of deep venous thrombosis in either lower extremity.    Xray Tibia + Fibula 2 Views, Bilateral (08.20.19 @ 22:50) >  Bilateral tib-fibs and Foot     Left tib-fib. 2 views.    There is a quite minimal metallic type density superimposing with the   anterior tibial plateau.    Bony structures are unremarkable.    Right tib-fib. 2 views.    Bony structures are unremarkable.    Bilateral ankles.    Left ankle. 3 views obtained.    There is a minimal inferior calcaneal spur. Otherwise no bony finding.    Right ankle. 3 views obtained. Right ankle appearance is symmetric to   left ankle appearance.    Left foot. 3 views obtained.    There is a fracture of the proximal a corner of the proximal phalanx of   the great toe. There is mild first MTP degeneration.    IMPRESSION: Left great toe fracture as above. Incidental findings as   above.      Assessment :   54 year old female admitted with possible infected deep laceration left calf following a fall from boat ladder on 8/20/19 , she probably has underlying hematoma . She has been taking PO Keflex x last 3 days . She could have polymicrobial infection due to exposure to salt sea water . She also has a possible right calf hematoma from soft tissue injury to right calf muscle . She is s/p I & D of the leg leg wound and removal of FB left foot .Clinically has improved   wound cs noted, she has allergies to levaquin.     Plan :   -  will continue with  po Vantin 200 mg bid x 10 days   - DC planning as per orthopedics   - needs VNS and wound vac set up   - elevate and ice compresses to right calf   - local wound care as per ortho  - Pain control   - she will follow with Dr. Jean 9([employer] ID) [phone] for out patient ID if needed     Continue with present regime .  Appropriate use of antibiotics and adverse effects reviewed.    I have discussed the above plan of care with patient in detail. She expressed understanding of the treatment plan . Risks, benefits and alternatives discussed in detail. I have asked if she has  any questions or concerns and appropriately addressed them to the best of my ability .      > 15 minutes spent in direct patient care reviewing  the notes, lab data/ imaging , discussion with multidisciplinary team. All questions were addressed and answered to the best of my capacity .    Thank you for allowing me to participate in the care of your patient .        Roberto Santizo MD  [phone]
ID progress note     Name: MYKE SONI  Age: 54y  Gender: Female  MRN: [id number]    Interval History--  Events noted, doing well, orthopedic follow up noted. Much improved right calf swelling. Improved pain left leg . s/p I & D of left leg wound and removal of FB from left foot , pod # 4 , Intra op cs noted . Ortho follow up noted , dc plan once home care set up . afebrile on po abx . Dc planning in progress   Notes reviewed    Past Medical History--  Hypothyroidism  H/O left knee surgery  History of elbow surgery  History of partial thyroidectomy  History of carpal tunnel surgery of right wrist  History of spinal surgery  S/P partial hysterectomy  History of laparoscopic cholecystectomy  No significant past surgical history      For details regarding the patient's social history, family history, and other miscellaneous elements, please refer the initial infectious diseases consultation and/or the admitting history and physical examination for this admission.    Allergies--  Allergies    Levaquin (Unknown)  morphine (Unknown)  niacin (Urticaria)    Intolerances        Medications--  Antibiotics:  cefpodoxime 200 milliGRAM(s) Oral every 12 hours    Immunologic:    Other:  acetaminophen   Tablet ..  aspirin  lactobacillus acidophilus  levothyroxine  oxyCODONE    IR PRN  oxyCODONE    IR PRN  pantoprazole    Tablet      Review of Systems--  A 10-point review of systems was obtained.     Pertinent positives and negatives--  Constitutional: No fevers. No Chills. No Rigors.   Cardiovascular: No chest pain. No palpitations.  Respiratory: No shortness of breath. No cough.  Gastrointestinal: No nausea or vomiting. No diarrhea or constipation.   Psychiatric: Pleasant. Appropriate affect.    Review of systems otherwise negative except as previously noted.    Physical Examination--  Vital Signs: T(F): 97.6 (09-03-19 @ 06:02), Max: 98.8 (09-02-19 @ 13:30)  HR: 60 (09-03-19 @ 06:02)  BP: 152/84 (09-03-19 @ 06:02)  RR: 18 (09-03-19 @ 06:02)  SpO2: 96% (09-03-19 @ 06:02)  Wt(kg): --  General: Nontoxic-appearing Female in no acute distress.  HEENT: AT/NC. PERRL. EOMI. Anicteric. Conjunctiva pink and moist. Oropharynx clear. Dentition fair.  Neck: Not rigid. No sense of mass.  Nodes: None palpable.  Lungs: Clear bilaterally without rales, wheezing or rhonchi  Heart: Regular rate and rhythm. No Murmur. No rub. No gallop. No palpable thrill.  Abdomen: Bowel sounds present and normoactive. Soft. Nondistended. Nontender. No sense of mass. No organomegaly.  Back: No spinal tenderness. No costovertebral angle tenderness.   Extremities: No cyanosis or clubbing. No edema. wound vac in place, no erythema . Wound as per ortho- wound clean/dry   Skin: Warm. Dry. Good turgor. No rash. No vasculitic stigmata.  Psychiatric: Appropriate affect and mood for situation.         Laboratory Studies--  CBC                        10.9   5.89  )-----------( 333      ( 02 Sep 2019 07:12 )             34.2       Chemistries  09-02    142  |  105  |  13  ----------------------------<  104<H>  4.0   |  28  |  0.88    Ca    9.0      02 Sep 2019 07:12        Culture Data  Culture - Surgical Swab (08.30.19 @ 21:19)    -  Gentamicin: S <=2    -  Gentamicin: S <=2    -  Levofloxacin: S <=2    -  Levofloxacin: S <=2    -  Meropenem: R >8    -  Meropenem: S <=1    -  Piperacillin/Tazobactam: S <=8    -  Piperacillin/Tazobactam: S <=8    -  Tobramycin: S <=2    -  Trimethoprim/Sulfamethoxazole: S <=2/38    -  Trimethoprim/Sulfamethoxazole: S <=2/38    -  Amikacin: S <=16    -  Amikacin: S <=16    -  Amoxicillin/Clavulanic Acid: R >16/8    -  Ampicillin: R >16 These ampicillin results predict results for amoxicillin    -  Ampicillin/Sulbactam: I 16/8    -  Ampicillin/Sulbactam: I 16/8 Enterobacter, Citrobacter, and Serratia may develop resistance during prolonged therapy (3-4 days)    -  Aztreonam: S <=4    -  Aztreonam: S <=4    -  Cefazolin: R >16    -  Cefazolin: R >16 Enterobacter, Citrobacter, and Serratia may develop resistance during prolonged therapy (3-4 days)    -  Cefepime: S <=2    -  Cefepime: S <=2    -  Cefoxitin: R >16    -  Cefoxitin: R >16    -  Ceftazidime: S <=1    -  Ceftriaxone: S <=1    -  Ceftriaxone: S <=1 Enterobacter, Citrobacter, and Serratia may develop resistance during prolonged therapy    -  Ciprofloxacin: S <=1    -  Ciprofloxacin: S <=1    -  Ertapenem: S <=0.5    Specimen Source: .Surgical Swab    Culture Results:   Moderate Aeromonas hydrophila  Moderate Citrobacter braakii    Organism Identification: Aeromonas hydrophila  Citrobacter braakii    Organism: Aeromonas hydrophila    Organism: Citrobacter braakii    Method Type: TYRON    Method Type: TYRON        Culture - Tissue with Gram Stain (collected 30 Aug 2019 21:19)  Source: .Tissue  Gram Stain (31 Aug 2019 03:38):    No polymorphonuclear leukocytes seen per low power field    No organisms seen per oil power field  Preliminary Report (01 Sep 2019 20:36):    Few Citrobacter freundii complex  Organism: Citrobacter freundii complex (01 Sep 2019 20:36)  Organism: Citrobacter freundii complex (01 Sep 2019 20:36)    Culture - Surgical Swab (collected 30 Aug 2019 21:19)  Source: .Surgical Swab  Preliminary Report (01 Sep 2019 20:32):    Few Citrobacter freundii complex  Organism: Citrobacter freundii complex (01 Sep 2019 20:31)  Organism: Citrobacter freundii complex (01 Sep 2019 20:31)      RADIOLOGY:  US Duplex Venous Lower Ext Complete, Bilateral (08.29.19 @ 17:40) >  FINDINGS:  There is normal compressibility of the bilateral common femoral, femoral   and popliteal veins.     Doppler examination shows normal spontaneous and phasic flow.    No calf vein thrombosis is detected.    IMPRESSION:   No evidence of deep venous thrombosis in either lower extremity.    Xray Tibia + Fibula 2 Views, Bilateral (08.20.19 @ 22:50) >  Bilateral tib-fibs and Foot     Left tib-fib. 2 views.    There is a quite minimal metallic type density superimposing with the   anterior tibial plateau.    Bony structures are unremarkable.    Right tib-fib. 2 views.    Bony structures are unremarkable.    Bilateral ankles.    Left ankle. 3 views obtained.    There is a minimal inferior calcaneal spur. Otherwise no bony finding.    Right ankle. 3 views obtained. Right ankle appearance is symmetric to   left ankle appearance.    Left foot. 3 views obtained.    There is a fracture of the proximal a corner of the proximal phalanx of   the great toe. There is mild first MTP degeneration.    IMPRESSION: Left great toe fracture as above. Incidental findings as   above.      Assessment :   54 year old female admitted with possible infected deep laceration left calf following a fall from boat ladder on 8/20/19 , she probably has underlying hematoma . She has been taking PO Keflex x last 3 days . She could have polymicrobial infection due to exposure to salt sea water . She also has a possible right calf hematoma from soft tissue injury to right calf muscle . She is s/p I & D of the leg leg wound and removal of FB left foot .Clinically has improved   wound cs noted, she has allergies to levaquin.     Plan :   -  will continue with  po Vantin 200 mg bid x 10 days   - DC planning as per orthopedics   - needs VNS and wound vac set up   - elevate and ice compresses to right calf   - local wound care as per ortho  - Pain control   - she will follow with Dr. Jean 9([employer] ID) [phone] for out patient ID if needed     Continue with present regime .  Appropriate use of antibiotics and adverse effects reviewed.    I have discussed the above plan of care with patient in detail. She expressed understanding of the treatment plan . Risks, benefits and alternatives discussed in detail. I have asked if she has any questions or concerns and appropriately addressed them to the best of my ability .      > 35 minutes spent in direct patient care reviewing  the notes, lab data/ imaging , discussion with multidisciplinary team. All questions were addressed and answered to the best of my capacity .    Thank you for allowing me to participate in the care of your patient .        Roberto Santizo MD  [phone]
POST OPERATIVE DAY #:    STATUS POST:  I & D left leg cellulitis/distal tibial wound                     SUBJECTIVE: Patient seen and examined. Seen by Dr. Monteiro this morning.  Feeling well.     Pain (0-10): 2      OBJECTIVE:     Vital Signs Last 24 Hrs  T(C): 36.6 (02 Sep 2019 09:00), Max: 36.9 (01 Sep 2019 17:09)  T(F): 97.8 (02 Sep 2019 09:00), Max: 98.4 (01 Sep 2019 17:09)  HR: 71 (02 Sep 2019 09:00) (61 - 71)  BP: 132/75 (02 Sep 2019 09:00) (115/69 - 132/75)  RR: 18 (02 Sep 2019 09:00) (16 - 18)  SpO2: 97% (02 Sep 2019 09:00) (95% - 98%)    Left lower extremity:          Wound vac removed: wound clean/dry          Sensation:  intact to light touch           Motor exam:  5/5 Tibialis Anterior/Gastrocnemius-Soleus          Calf supple & NT         2+ DP pulse  LABS:                        10.9   5.89  )-----------( 333      ( 02 Sep 2019 07:12 )             34.2     09-02    142  |  105  |  13  ----------------------------<  104<H>  4.0   |  28  |  0.88    Ca    9.0      02 Sep 2019 07:12              Anticoagulation: Aspirin 325mg daily      Pain medications:   acetaminophen   Tablet .. 1000 milliGRAM(s) Oral every 8 hours  aspirin 325 milliGRAM(s) Oral daily  oxyCODONE    IR 10 milliGRAM(s) Oral every 3 hours PRN  oxyCODONE    IR 5 milliGRAM(s) Oral every 3 hours PRN        A/P : s/p I & D Left leg cellulitis/distal tibia wound POD # 3  -    wound vac changed under sterile conditions after 1 dose of Dilaudid 0.5mg IV  -    Pain control  -    DVT ppx: Lovenox changed to aspirin 325mg daily per Dr. Monteiro  -    Weight bearing status: WBAT  -    ABx per Dr. Santizo - changed to Vantin 200mg q 12h x 10 days  -    Dispo: home when wound care/VNS set up, possibly tomorrow
POST OPERATIVE DAY #:  4  STATUS POST:  Left distal tibial wound I & D                     SUBJECTIVE: Patient seen and examined.    Pain (0-10):       OBJECTIVE:     Vital Signs Last 24 Hrs  T(C): 36.4 (03 Sep 2019 06:02), Max: 37.1 (02 Sep 2019 13:30)  T(F): 97.6 (03 Sep 2019 06:02), Max: 98.8 (02 Sep 2019 13:30)  HR: 60 (03 Sep 2019 06:02) (58 - 70)  BP: 152/84 (03 Sep 2019 06:02) (121/74 - 152/84)  RR: 18 (03 Sep 2019 06:02) (17 - 18)  SpO2: 96% (03 Sep 2019 06:02) (96% - 98%)    Left lower extremity:          Dressing:  clean/dry/intact, wound vac in place & functioning           Sensation:  intact to light touch           Motor exam:  5/5 Tibialis Anterior/Gastrocnemius-Soleus           warm well perfused; capillary refill <3 seconds       Anticoagulation:  aspirin 325 milliGRAM(s) Oral daily      Pain medications:   acetaminophen   Tablet .. 1000 milliGRAM(s) Oral every 8 hours  aspirin 325 milliGRAM(s) Oral daily  oxyCODONE    IR 10 milliGRAM(s) Oral every 3 hours PRN  oxyCODONE    IR 5 milliGRAM(s) Oral every 3 hours PRN        A/P : s/p Left distal tibial wound I & D   -    Pain control  -    DVT ppx: aspirin 325 mg daily  -    Weight bearing status: WBAT  -    Dispo: home with wound vac and oral abx, once home care arrangements are in place
POST OPERATIVE DAY #:  5  STATUS POST:  Left distal tibial wound I& D                     SUBJECTIVE: Patient seen and examined. Feeling well. No complaints. Wants to go home ASAP.    Pain (0-10): 2      OBJECTIVE:     Vital Signs Last 24 Hrs  T(C): 36.6 (04 Sep 2019 05:30), Max: 36.9 (03 Sep 2019 13:35)  T(F): 97.9 (04 Sep 2019 05:30), Max: 98.4 (03 Sep 2019 13:35)  HR: 59 (04 Sep 2019 05:30) (59 - 77)  BP: 129/77 (04 Sep 2019 05:30) (112/68 - 148/81)  RR: 18 (04 Sep 2019 05:30) (18 - 18)  SpO2: 96% (04 Sep 2019 05:30) (96% - 98%)    Left lower extremity:          Wound vac removed: wound clean & dry, no discharge. Tissue pink           Sensation:  intact to light touch           Motor exam:  5/5 Tibialis Anterior/Gastrocnemius-Soleus           warm well perfused; capillary refill <3 seconds       Anticoagulation: aspirin 325mg daily      Pain medications:   acetaminophen   Tablet .. 1000 milliGRAM(s) Oral every 8 hours  aspirin 325 milliGRAM(s) Oral daily  oxyCODONE    IR 10 milliGRAM(s) Oral every 3 hours PRN  oxyCODONE    IR 5 milliGRAM(s) Oral every 3 hours PRN        A/P : s/p Left distal tibial wound I& D  -    wet to dry dressing applied  -    Pain control  -    Continue oral abx  -    DVT ppx: Aspirin 325mg daily  -    Weight bearing status: WBAT  -    Physical Therapy  -    Occupational Therapy  -    Dispo: home today.  Wound vac to be placed at home tomorrow
Patient is a 54y old  Female who presents with a chief complaint of Infection Left lower leg (04 Sep 2019 09:21)      INTERVAL HPI/OVERNIGHT EVENTS: afebrile overnight. Feels well. No complaints. Anticipating discharge. Pending PT evaluation.    T(C): 36.6 (09-04-19 @ 05:30), Max: 36.9 (09-03-19 @ 13:35)  HR: 59 (09-04-19 @ 05:30) (59 - 77)  BP: 129/77 (09-04-19 @ 05:30) (112/68 - 148/81)  RR: 18 (09-04-19 @ 05:30) (18 - 18)  SpO2: 96% (09-04-19 @ 05:30) (96% - 98%)  Wt(kg): --  I&O's Summary      LABS:              CAPILLARY BLOOD GLUCOSE                MEDICATIONS  (STANDING):  acetaminophen   Tablet .. 1000 milliGRAM(s) Oral every 8 hours  aspirin 325 milliGRAM(s) Oral daily  cefpodoxime 200 milliGRAM(s) Oral every 12 hours  lactobacillus acidophilus 1 Tablet(s) Oral two times a day with meals  levothyroxine 137 MICROGram(s) Oral daily  pantoprazole    Tablet 40 milliGRAM(s) Oral before breakfast    MEDICATIONS  (PRN):  oxyCODONE    IR 10 milliGRAM(s) Oral every 3 hours PRN Moderate Pain (4 - 6)  oxyCODONE    IR 5 milliGRAM(s) Oral every 3 hours PRN Mild Pain (1 - 3)      REVIEW OF SYSTEMS:  CONSTITUTIONAL: No fever, weight loss, or fatigue  EYES: No eye pain, visual disturbances, or discharge  ENMT:  No difficulty hearing, tinnitus, vertigo; No sinus or throat pain  NECK: No pain or stiffness  RESPIRATORY: No cough, wheezing, chills or hemoptysis; No shortness of breath  CARDIOVASCULAR: No chest pain, palpitations, dizziness, or leg swelling  GASTROINTESTINAL: No abdominal or epigastric pain. No nausea, vomiting, or hematemesis; No diarrhea or constipation. No melena or hematochezia.  GENITOURINARY: No dysuria, frequency, hematuria, or incontinence  NEUROLOGICAL: No headaches, memory loss, loss of strength, numbness, or tremors  SKIN: No itching, burning, rashes, or lesions   LYMPH NODES: No enlarged glands  ENDOCRINE: No heat or cold intolerance; No hair loss  MUSCULOSKELETAL: No joint pain or swelling; No muscle, back, or extremity pain  PSYCHIATRIC: No depression, anxiety, mood swings, or difficulty sleeping  HEME/LYMPH: No easy bruising, or bleeding gums  ALLERGY AND IMMUNOLOGIC: No hives or eczema    RADIOLOGY & ADDITIONAL TESTS:    Imaging Personally Reviewed:  [x] YES  [ ] NO    Consultant(s) Notes Reviewed:  [x] YES  [ ] NO    PHYSICAL EXAM:  GENERAL: NAD, well-groomed, well-developed  HEAD:  Atraumatic, Normocephalic  EYES: EOMI, PERRLA, conjunctiva and sclera clear  ENMT: No tonsillar erythema, exudates, or enlargement; Moist mucous membranes, Good dentition, No lesions  NECK: Supple, No JVD, Normal thyroid  NERVOUS SYSTEM:  Alert & Oriented X3, Good concentration; Motor Strength 5/5 B/L upper and lower extremities; DTRs 2+ intact and symmetric  CHEST/LUNG: Clear to percussion bilaterally; No rales, rhonchi, wheezing, or rubs  HEART: Regular rate and rhythm; No murmurs, rubs, or gallops  ABDOMEN: Soft, Nontender, Nondistended; Bowel sounds present  EXTREMITIES:  2+ Peripheral Pulses, right calf palpable hematoma. left lower extremity wound wrapped c/d/i  LYMPH: No lymphadenopathy noted  SKIN: left lower extremity wound    Care Discussed with Consultants/Other Providers (Geri ALEJANDRO) [x] YES  [ ] NO
Patient is a 54y old  Female who presents with a chief complaint of Infection Left lower leg (30 Aug 2019 10:12)    Patient seen and examined at bedside.    ALLERGIES:  Levaquin (Unknown)  morphine (Unknown)  niacin (Urticaria)    MEDICATIONS  (STANDING):  lactated ringers. 1000 milliLiter(s) (100 mL/Hr) IV Continuous <Continuous>  levothyroxine 137 MICROGram(s) Oral daily  piperacillin/tazobactam IVPB. 3.375 Gram(s) IV Intermittent once  piperacillin/tazobactam IVPB.. 3.375 Gram(s) IV Intermittent every 8 hours  vancomycin  IVPB 1250 milliGRAM(s) IV Intermittent every 12 hours    MEDICATIONS  (PRN):  acetaminophen   Tablet .. 1000 milliGRAM(s) Oral every 6 hours PRN Mild Pain (1 - 3)    Vital Signs Last 24 Hrs  T(F): 97.7 (30 Aug 2019 09:00), Max: 98.4 (29 Aug 2019 19:30)  HR: 68 (30 Aug 2019 09:00) (63 - 80)  BP: 121/73 (30 Aug 2019 09:00) (104/62 - 148/87)  RR: 19 (30 Aug 2019 09:00) (16 - 19)  SpO2: 96% (30 Aug 2019 09:00) (95% - 99%)  I&O's Summary    29 Aug 2019 07:01  -  30 Aug 2019 07:00  --------------------------------------------------------  IN: 1100 mL / OUT: 250 mL / NET: 850 mL      PHYSICAL EXAM:  General: NAD, A/O x 3  ENT: MMM, no thrush  Neck: Supple, No JVD  Lungs: Clear to auscultation bilaterally, good air entry, non-labored breathing  Cardio: +s1/s2  Abdomen: Soft, Nontender, Nondistended; Bowel sounds present  Extremities: No calf tenderness, +2 edema b/l le;  wound noted on b/l le left worse then right; LLE laceration noted;   both LE erythematous and tender to touch     LABS:                        13.0   7.25  )-----------( 380      ( 29 Aug 2019 16:08 )             40.1         136  |  102  |  14  ----------------------------<  119  3.5   |  28  |  0.74    Ca    9.4      29 Aug 2019 16:08    TPro  8.0  /  Alb  3.8  /  TBili  0.4  /  DBili  x   /  AST  33  /  ALT  84  /  AlkPhos  173      eGFR if Non African American: 92 mL/min/1.73M2 (19 @ 16:08)  eGFR if : 107 mL/min/1.73M2 (19 @ 16:08)    PT/INR - ( 29 Aug 2019 16:08 )   PT: 12.4 sec;   INR: 1.13 ratio    PTT - ( 29 Aug 2019 16:08 )  PTT:36.5 sec    Urinalysis Basic - ( 30 Aug 2019 06:21 )  Color: Yellow / Appearance: Clear / S.020 / pH: x  Gluc: x / Ketone: Negative  / Bili: Negative / Urobili: Negative mg/dL   Blood: x / Protein: Negative mg/dL / Nitrite: Negative   Leuk Esterase: Negative / RBC: x / WBC x   Sq Epi: x / Non Sq Epi: x / Bacteria: x    RADIOLOGY & ADDITIONAL TESTS:  < from: Xray Chest 1 View AP/PA (19 @ 16:24) >  Impression:  Clear lungs.  < end of copied text >    < from: US Duplex Venous Lower Ext Complete, Bilateral (19 @ 17:40) >  IMPRESSION:   No evidence of deep venous thrombosis in either lower extremity.  < end of copied text >    Care Discussed with Consultants/Other Providers:   ID
Patient seen and examined at bedside. Doing well. Refused examination again. Was able to confirm plan for today. Later reports "there are two physicians already taking care of me".  Sheila did report he/she was attending on record. her response "that's ok I understand"    Review of Systems:  General:denies fever chills, headache, weakness  HEENT: denies blurry vision,diffculty swallowing,  Cardiovascular: denies chest pain  ,palpitatins  Pulmonary:denies shortness of breath, cough, wheezing  Gastrointestinal: denies abdominal pain, constipation, diarrhra  : denies hematuria, dysuria  Neurological: denies weakness, numbness , tingling, dizziness  skin: denies skin rash  Psychiatrical: denies mood disturbances          Objective:  Vitals  T(C): 36.4 (09-02-19 @ 05:32), Max: 36.9 (09-01-19 @ 17:09)  HR: 62 (09-02-19 @ 05:32) (61 - 65)  BP: 115/69 (09-02-19 @ 05:32) (115/69 - 124/72)  RR: 18 (09-02-19 @ 05:32) (16 - 18)  SpO2: 95% (09-02-19 @ 05:32) (95% - 98%)    Physical Exam:  refused    Labs:                          10.9   5.89  )-----------( 333      ( 02 Sep 2019 07:12 )             34.2     09-02    142  |  105  |  13  ----------------------------<  104<H>  4.0   |  28  |  0.88    Ca    9.0      02 Sep 2019 07:12              Active Medications  MEDICATIONS  (STANDING):  acetaminophen   Tablet .. 1000 milliGRAM(s) Oral every 8 hours  cefpodoxime 200 milliGRAM(s) Oral every 12 hours  enoxaparin Injectable 40 milliGRAM(s) SubCutaneous every 24 hours  lactobacillus acidophilus 1 Tablet(s) Oral two times a day with meals  levothyroxine 137 MICROGram(s) Oral daily  pantoprazole    Tablet 40 milliGRAM(s) Oral before breakfast    MEDICATIONS  (PRN):  oxyCODONE    IR 10 milliGRAM(s) Oral every 3 hours PRN Moderate Pain (4 - 6)  oxyCODONE    IR 5 milliGRAM(s) Oral every 3 hours PRN Mild Pain (1 - 3)
Patient seen and examined at bedside. No complaints today. Refused exam.  Cultures preliminary growing gram negative rocds      Review of Systems:  General:denies fever chills, headache, weakness  HEENT: denies blurry vision,diffculty swallowing,  Cardiovascular: denies chest pain  ,palpitatins  Pulmonary:denies shortness of breath, cough, wheezing  Gastrointestinal: denies abdominal pain, constipation, diarrhra  : denies hematuria, dysuria  Neurological: denies weakness, numbness , tingling, dizziness  skin: denies skin rash  Psychiatrical: denies mood disturbances          Objective:  Vitals  T(C): 36.7 (09-01-19 @ 05:15), Max: 36.9 (08-31-19 @ 13:25)  HR: 58 (09-01-19 @ 05:15) (58 - 75)  BP: 107/66 (09-01-19 @ 05:15) (104/61 - 133/75)  RR: 16 (09-01-19 @ 05:15) (16 - 18)  SpO2: 96% (09-01-19 @ 05:15) (96% - 98%)    Physical Exam:  refused  today.  Labs:                          11.2   6.77  )-----------( 367      ( 01 Sep 2019 07:55 )             35.6     09-01    142  |  105  |  14  ----------------------------<  96  4.0   |  30  |  0.94    Ca    9.1      01 Sep 2019 07:55              Active Medications  MEDICATIONS  (STANDING):  acetaminophen   Tablet .. 1000 milliGRAM(s) Oral every 8 hours  enoxaparin Injectable 40 milliGRAM(s) SubCutaneous every 24 hours  levothyroxine 137 MICROGram(s) Oral daily  pantoprazole    Tablet 40 milliGRAM(s) Oral before breakfast  piperacillin/tazobactam IVPB.. 3.375 Gram(s) IV Intermittent every 8 hours    MEDICATIONS  (PRN):  oxyCODONE    IR 10 milliGRAM(s) Oral every 3 hours PRN Moderate Pain (4 - 6)  oxyCODONE    IR 5 milliGRAM(s) Oral every 3 hours PRN Mild Pain (1 - 3)
Patient seen and examined at bedside. Reports she is doing well. the hematoma on her right leg is improving. left foot pain improving.          Review of Systems:  General:denies fever chills, headache, weakness  HEENT: denies blurry vision,diffculty swallowing,  Cardiovascular: denies chest pain  ,palpitatins  Pulmonary:denies shortness of breath, cough, wheezing  Gastrointestinal: denies abdominal pain, constipation, diarrhra  : denies hematuria, dysuria  Neurological: denies weakness, numbness , tingling, dizziness  MSK:: discomfort on right calf, discomfort left foot.  skin: denies skin rash  Psychiatrical: denies mood disturbances          Objective:  Vitals  T(C): 36.8 (08-31-19 @ 09:10), Max: 36.9 (08-30-19 @ 14:50)  HR: 60 (08-31-19 @ 09:10) (60 - 88)  BP: 106/66 (08-31-19 @ 09:10) (99/54 - 190/80)  RR: 17 (08-31-19 @ 09:10) (10 - 19)  SpO2: 98% (08-31-19 @ 09:10) (94% - 100%)    Physical Exam:  General: comfortable, no acute distress, well nourished  HEENT: no LAD, trachea midline  Cardiovascular: normal s1s2, no mrg  Pulmonary: CTA Bilaterally, no wheezing , rhonchi  Gastrointestinal: soft non tender non distended, no masses felt  Muscloskeletal: no lower extremity edema.. scds. in place. wound vac in place.  Neurological: CN II-12 intact. No focal weakness  Psychiatrical: normal mood, cooperative    Labs:                          12.3   10.51 )-----------( 396      ( 31 Aug 2019 07:09 )             38.3     08-31    138  |  102  |  9   ----------------------------<  138<H>  4.1   |  26  |  0.75    Ca    9.2      31 Aug 2019 07:04    TPro  8.0  /  Alb  3.8  /  TBili  0.4  /  DBili  x   /  AST  33  /  ALT  84<H>  /  AlkPhos  173<H>  08-29    LIVER FUNCTIONS - ( 29 Aug 2019 16:08 )  Alb: 3.8 g/dL / Pro: 8.0 g/dL / ALK PHOS: 173 U/L / ALT: 84 U/L DA / AST: 33 U/L / GGT: x           PT/INR - ( 29 Aug 2019 16:08 )   PT: 12.4 sec;   INR: 1.13 ratio         PTT - ( 29 Aug 2019 16:08 )  PTT:36.5 sec      Active Medications  MEDICATIONS  (STANDING):  acetaminophen   Tablet .. 1000 milliGRAM(s) Oral every 8 hours  enoxaparin Injectable 40 milliGRAM(s) SubCutaneous every 24 hours  levothyroxine 137 MICROGram(s) Oral daily  pantoprazole    Tablet 40 milliGRAM(s) Oral before breakfast  piperacillin/tazobactam IVPB.. 3.375 Gram(s) IV Intermittent every 8 hours  vancomycin  IVPB 1250 milliGRAM(s) IV Intermittent every 12 hours    MEDICATIONS  (PRN):  oxyCODONE    IR 10 milliGRAM(s) Oral every 3 hours PRN Moderate Pain (4 - 6)  oxyCODONE    IR 5 milliGRAM(s) Oral every 3 hours PRN Mild Pain (1 - 3)
Pt seen and examined. Pain controlled.   Doing well. Denies fevers or chills.     Patient is a 54y old  Female who presents with a chief complaint of Infection Left lower leg (03 Sep 2019 13:54)      INTERVAL HPI/OVERNIGHT EVENTS: 53 yo F with hx of Partial thyroidectomy c/b hypothyroidism,  s/p back fusion ,  admitted for evaluation of  infection in her left lower leg. Recently in [facility]. She was treated for lacerations and discharged with outpatient followup . Now in [facility] for increased swelling, pain and discharge in the left lower leg from likely a fall from a ladder. Cultures obtained in outpatient office. Now admitted for treatment and I+D of the left leg with new found Foreign body.      Pain Location & Control: Controlled, LLE.     MEDICATIONS  (STANDING):  acetaminophen   Tablet .. 1000 milliGRAM(s) Oral every 8 hours  aspirin 325 milliGRAM(s) Oral daily  cefpodoxime 200 milliGRAM(s) Oral every 12 hours  lactobacillus acidophilus 1 Tablet(s) Oral two times a day with meals  levothyroxine 137 MICROGram(s) Oral daily  pantoprazole    Tablet 40 milliGRAM(s) Oral before breakfast    MEDICATIONS  (PRN):  oxyCODONE    IR 10 milliGRAM(s) Oral every 3 hours PRN Moderate Pain (4 - 6)  oxyCODONE    IR 5 milliGRAM(s) Oral every 3 hours PRN Mild Pain (1 - 3)      Allergies    Levaquin (Unknown)  morphine (Unknown)  niacin (Urticaria)    Intolerances        REVIEW OF SYSTEMS:  CONSTITUTIONAL: No fever, weight loss, or fatigue  EYES: No eye pain, visual disturbances, or discharge  ENMT:  No difficulty hearing, tinnitus, vertigo; No sinus or throat pain  NECK: No pain or stiffness  BREASTS: No pain, masses, or nipple discharge  RESPIRATORY: No cough, wheezing, chills or hemoptysis; No shortness of breath  CARDIOVASCULAR: No chest pain, palpitations, or lightheadedness  GASTROINTESTINAL: No abdominal or epigastric pain. No nausea, vomiting, or hematemesis; No diarrhea or constipation. No melena or hematochezia.  GENITOURINARY: No dysuria, frequency, hematuria, or incontinence  NEUROLOGICAL: No headaches, vertigo, memory loss, loss of strength, numbness, or tremors  SKIN: No itching, burning, rashes, or lesions   LYMPH NODES: No enlarged glands  ENDOCRINE: No heat or cold intolerance; No hair loss; No polydipsia or polyuria  MUSCULOSKELETAL: No back pain  PSYCHIATRIC: No depression, anxiety, or mood swings  HEME/LYMPH: No easy bruising, or bleeding gums  ALLERGY AND IMMUNOLOGIC: No hives or eczema    Vital Signs Last 24 Hrs  T(C): 36.9 (03 Sep 2019 13:35), Max: 36.9 (03 Sep 2019 13:35)  T(F): 98.4 (03 Sep 2019 13:35), Max: 98.4 (03 Sep 2019 13:35)  HR: 77 (03 Sep 2019 13:35) (58 - 77)  BP: 148/81 (03 Sep 2019 13:35) (121/74 - 152/84)  BP(mean): --  RR: 18 (03 Sep 2019 13:35) (18 - 18)  SpO2: 98% (03 Sep 2019 13:35) (95% - 98%)    PHYSICAL EXAM:  GENERAL: NAD, well-groomed, well-developed  HEAD:  Atraumatic, Normocephalic  EYES: EOMI, PERRLA, conjunctiva and sclera clear  ENMT: Moist mucous membranes, Good dentition, No lesions; No tonsillar erythema, exudates, or enlargement   NECK: Supple, No JVD, Normal thyroid  NERVOUS SYSTEM:  Alert & Oriented X3, Good concentration; Bilateral LE mobile, sensation to light touch intact  CHEST/LUNG: Clear to auscultation bilaterally; No rales, rhonchi, wheezing, or rubs  HEART: Regular rate and rhythm; No murmurs, rubs, or gallops  ABDOMEN: Soft, Nontender, Nondistended; Bowel sounds present  EXTREMITIES:  2+ Peripheral Pulses, No clubbing or cyanosis, LLE dressing in place with wound vac with some serosanguinous fluids. RLE is tender from hematoma.   LYMPH: No lymphadenopathy noted  SKIN: No rashes or lesions  INCISION:  Dressing dry and intact    LABS:      Ca    9.0        02 Sep 2019 07:12          CAPILLARY BLOOD GLUCOSE          RADIOLOGY & ADDITIONAL TESTS:    Imaging Personally Reviewed:      [ ] Consultant(s) Notes Reviewed  [x] Care Discussed with Consultants/Other Providers:  Ortho PA- plan of care

## 2019-09-06 NOTE — PATIENT PROFILE ADULT - BRADEN ACTIVITY
(4) walks frequently
Patient resides in a condo style home with a domestic partner and 8 steps to enter +bilateral handrails for assist. Pt reports being independent with functional mobility and ADLs prior to admit. Pt states having a single-axis cane and RW at home.

## 2019-09-13 ENCOUNTER — EMERGENCY (EMERGENCY)
Facility: HOSPITAL | Age: 54
LOS: 1 days | Discharge: HOME | End: 2019-09-13
Attending: EMERGENCY MEDICINE
Payer: COMMERCIAL

## 2019-09-13 VITALS
TEMPERATURE: 98 F | HEIGHT: 69 IN | DIASTOLIC BLOOD PRESSURE: 80 MMHG | HEART RATE: 73 BPM | OXYGEN SATURATION: 98 % | RESPIRATION RATE: 18 BRPM | WEIGHT: 175.05 LBS | SYSTOLIC BLOOD PRESSURE: 151 MMHG

## 2019-09-13 DIAGNOSIS — Z98.890 OTHER SPECIFIED POSTPROCEDURAL STATES: Chronic | ICD-10-CM

## 2019-09-13 DIAGNOSIS — Z90.711 ACQUIRED ABSENCE OF UTERUS WITH REMAINING CERVICAL STUMP: Chronic | ICD-10-CM

## 2019-09-13 DIAGNOSIS — Z90.09 ACQUIRED ABSENCE OF OTHER PART OF HEAD AND NECK: Chronic | ICD-10-CM

## 2019-09-13 DIAGNOSIS — Z90.49 ACQUIRED ABSENCE OF OTHER SPECIFIED PARTS OF DIGESTIVE TRACT: Chronic | ICD-10-CM

## 2019-09-13 PROCEDURE — 99282 EMERGENCY DEPT VISIT SF MDM: CPT

## 2019-09-13 NOTE — ED ADULT TRIAGE NOTE - CHIEF COMPLAINT QUOTE
Pt present with wound vac in place for LLL injury, reports anxiety with situation, pt reports wound care did not come to change vacuum that stop working ~2000, pt denies pain offers no medical complaints, present calm and cooperative to care

## 2019-09-13 NOTE — ED ADULT NURSE NOTE - OBJECTIVE STATEMENT
Pt comes in A+Ox4, for wound check. Pt had fall off of a dock in August and sustained wound to L medial shin treated and wound vac in place. Today pt comes in with wound vac problems: wound vac stopped working about 2100, pt called company and home health service who instructed her to come into the ED. Pt comes into ED stating "they told me to do it myself, remove the wound vac, apply wet to dry dressing, and home health RN will be there tomorrow morning to replace/fix wound vac but Im not a nurse or a doctor and I don't feel comfortable doing that." Pt with positive pedal pulse b/l, skin intact apart from wound site, brisk cap refill b/l. Pt does not offer any other complaints, denies shortness of breath, denies chest pain. Note that pt mentions she has a "broken L great toe" and L foot is in an ortho shoe. Pt is able to weight bare on both feet without difficulty, denies any pain at this time.

## 2019-09-14 NOTE — ED PROVIDER NOTE - PHYSICAL EXAMINATION
Gen: NAD, AOx3  Head: NCAT  HEENT: EOMI, oral mucosa moist, normal conjunctiva, neck supple  Lung: no respiratory distress  CV:  Normal perfusion  MSK: 3 cm open wound left medial shin, no erythema, clean granulation tissue present.  Neuro: No focal neurologic deficits  Skin: No rash   Psych: normal affect

## 2019-09-14 NOTE — ED PROVIDER NOTE - OBJECTIVE STATEMENT
55 y/o F pt with no significant PMHx presents to the ED c/o wound complications. Pt had a wound vac placed on August 30th, that stopped working yesterday. Today the health home aide came to clean and replace the dressing and at 21:00 yesterday it stopped working. She spoke with her surgeon and was told due to the lack of resources available she can have a wet to dry dressing placed and they will send someone tomorrow to fix the wound vac. Denies fever. No further complaints at this time.   Infectious Disease:   Ortho:   Plastics:

## 2019-09-14 NOTE — ED PROVIDER NOTE - NS_ ATTENDINGSCRIBEDETAILS _ED_A_ED_FT
I, Gala Blood, performed the initial face to face bedside interview with this patient regarding history of present illness, review of symptoms and relevant past medical, social and family history.  I completed an independent physical examination.  The history, relevant review of systems, past medical and surgical history, medical decision making, and physical examination was documented by the scribe in my presence and I attest to the accuracy of the documentation.
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
## 2019-09-14 NOTE — ED PROVIDER NOTE - CLINICAL SUMMARY MEDICAL DECISION MAKING FREE TEXT BOX
Pt with wound vac malfunction, spoke to plastic surgeon needs to be removed, wet to dry dressing. Wound vac will be replaced tomorrow. Does not appear infected will clean area and place dressing and dc home.

## 2019-09-14 NOTE — ED PROVIDER NOTE - PATIENT PORTAL LINK FT
You can access the FollowMyHealth Patient Portal offered by  by registering at the following website: [email or web address]. By joining [employer]’s FollowMyHealth portal, you will also be able to view your health information using other applications (apps) compatible with our system.

## 2019-10-30 NOTE — PATIENT PROFILE ADULT - NSPROCHRONICPAINLOC_GEN_A_NUR
cervical spine Housing problems/Educational problems/Problems with primary support/Economic problems

## 2020-10-14 NOTE — ED ADULT NURSE NOTE - PRIMARY CARE PROVIDER
see MD note pt presents w/ recurrent urinary sx, fever, dribbling of urine. w/u grossly normal. ua possibly partially treated. no e/o pyelo on ct. pt now endorsing hx controlled HIV. pt refuses to stay inpt for IV abx. pt states she has outpt f/u w/ ID Dr. Aguilar. will ama as unclear if uti fully susceptible and pt early on course to develop worsening UTI. will tx pt w/ cipro.

## 2020-11-21 NOTE — ED ADULT TRIAGE NOTE - HEIGHT IN FEET
Patient is in the lateral left side position.   The body was positioned using the following devices: wedge.   5

## 2021-06-28 NOTE — ED ADULT NURSE NOTE - NS ED PATIENT SAFETY CONCERN
Problem: At Risk for Falls  Goal: # Patient does not fall  Outcome: Outcome Met, Continue evaluating goal progress toward completion  Goal: # Takes action to control fall-related risks  Outcome: Outcome Met, Continue evaluating goal progress toward completion  Goal: # Verbalizes understanding of fall risk/precautions  Description: Document education using the patient education activity  Outcome: Outcome Met, Continue evaluating goal progress toward completion     Problem: At Risk for Injury Due to Fall  Goal: # Patient does not fall  Outcome: Outcome Met, Continue evaluating goal progress toward completion  Goal: # Takes action to control condition specific risks  Outcome: Outcome Met, Continue evaluating goal progress toward completion  Goal: # Verbalizes understanding of fall-related injury personal risks  Description: Document education using the patient education activity  Outcome: Outcome Met, Continue evaluating goal progress toward completion     Problem: Pain  Goal: #Acceptable pain level achieved/maintained at rest using NRS/Faces  Description: This goal is used for patients who can self-report.  Acceptable means the level is at or below the identified comfort/function goal.  Outcome: Outcome Met, Continue evaluating goal progress toward completion     Problem: VTE, Risk for  Goal: # No s/s of VTE  Outcome: Outcome Met, Continue evaluating goal progress toward completion  Goal: # Verbalizes understanding of VTE risk factors and prevention  Description: Document education using the patient education activity.   Outcome: Outcome Met, Continue evaluating goal progress toward completion  Goal: Demonstrates ability to administer injectable anticoagulants if ordered for d/c  Description: Document education using the patient education activity.  Outcome: Outcome Met, Continue evaluating goal progress toward completion      Worsening SOB, sent in from Cardiology office for hypoxia. Worsening SOB, sent in from Cardiology office for hypoxia. Worsening SOB, sent in from Cardiology office for hypoxia. Worsening SOB, sent in from Cardiology office for hypoxia. Worsening SOB, sent in from Cardiology office for hypoxia. Worsening SOB, sent in from Cardiology office for hypoxia. Worsening SOB, sent in from Cardiology office for hypoxia. Worsening SOB, sent in from Cardiology office for hypoxia. Worsening SOB, sent in from Cardiology office for hypoxia. Worsening SOB, sent in from Cardiology office for hypoxia. Worsening SOB, sent in from Cardiology office for hypoxia. No

## 2021-11-27 DIAGNOSIS — Z01.818 ENCOUNTER FOR OTHER PREPROCEDURAL EXAMINATION: ICD-10-CM

## 2021-11-28 ENCOUNTER — APPOINTMENT (OUTPATIENT)
Dept: DISASTER EMERGENCY | Facility: CLINIC | Age: 56
End: 2021-11-28

## 2021-11-29 LAB — SARS-COV-2 N GENE NPH QL NAA+PROBE: NOT DETECTED

## 2022-06-01 ENCOUNTER — APPOINTMENT (OUTPATIENT)
Dept: ORTHOPEDIC SURGERY | Facility: CLINIC | Age: 57
End: 2022-06-01
Payer: COMMERCIAL

## 2022-06-01 VITALS — HEIGHT: 69 IN | BODY MASS INDEX: 25.92 KG/M2 | WEIGHT: 175 LBS

## 2022-06-01 DIAGNOSIS — M75.52 BURSITIS OF LEFT SHOULDER: ICD-10-CM

## 2022-06-01 DIAGNOSIS — M75.42 IMPINGEMENT SYNDROME OF LEFT SHOULDER: ICD-10-CM

## 2022-06-01 DIAGNOSIS — M54.2 CERVICALGIA: ICD-10-CM

## 2022-06-01 DIAGNOSIS — M25.512 PAIN IN LEFT SHOULDER: ICD-10-CM

## 2022-06-01 PROCEDURE — 99214 OFFICE O/P EST MOD 30 MIN: CPT

## 2022-06-01 PROCEDURE — 72040 X-RAY EXAM NECK SPINE 2-3 VW: CPT

## 2022-06-01 NOTE — DISCUSSION/SUMMARY
[de-identified] : 56yo female with ongoing left shoulder pain and neck pain x 3-4 months. \par Previous subacromial injection to left shoudler provided mild to moderate relief. \par SHe has been completing more than 6 week of conservative treatmetn including PT/HEP and anti-inflammatory medication. \par She continues ot have moderate lateral pain and discomfort. \par At this point we are recommending MRI to evaluate for full thickness rotator cuff tear. \par Based on the patient's history and physical exam findings, I am concerned about the possibility of a full-thickness rotator cuff tear. The patient has pain and subjective weakness consistent with this diagnosis. Therefore, I recommend a MRI to evaluate for a rotator cuff tear. This will also aid in evaluating for injury to biceps labral complex as well as for any signs of impingement. \par \par WIth respect to cervical spine patient has advanced multilevel degeneration. xrays today demonstrates narrowing specifically at C5-C6. \par Considering the patient has radicular symptoms we are requesting MRI of cervical spine to rule our further progression of stenosis and new herniations compared to previous study in 2017. \par She has completed more than 6 weeks of recent conservative treatment \par Patient was prescribed valium to complete the study. \par Recommended further evaluation by pain management vs spine specialist once the MRI is complete. \par \par Patient was prescribed MDP to help alleviate inflammation\par \par (1) We discussed a comprehensive treatment plans that included a prescription management plan involving the use of prescription strength medications to include Ibuprofen 600-800 mg TID, versus 500-650 mg Tylenol. We also discussed prescribing topical diclofenac (Voltaren gel) as well as once daily Meloxicam 15 mg.\par \par (2) The patient has More Than One chronic injuries/illnesses as outlined, discussed, and documented by ICD 10 codes listed, as well as the HPI and Plan section.\par There is a moderate risk of morbidity with further treatment, especially from use of prescription strength medications and possible side effects of these medications which include upset stomach and cardiac/renal issues with long term use were discussed.\par \par (3) I recommended that the patient follow-up with their medical physician to discuss any significant specific potential issues with long term use such as interactions with current medications or with exacerbation of underlying medical morbidities.\par \par \par

## 2022-06-01 NOTE — PHYSICAL EXAM
[Straightening consistent with spasm] : Straightening consistent with spasm [Disc space narrowing] : Disc space narrowing [de-identified] : Constitutional: The general appearance of the patient is well developed, well nourished, no deformities and well groomed. Normal \par \par Gait: Gait and function is as follows: normal gait. \par \par Skin: Head and neck visualized skin is normal. Left upper extremity visualized skin is normal. Right upper extremity visualized skin is normal. Thoracic Skin of the thoracic spine shows visualized skin is normal. \par \par Cardiovascular: palpable radial pulse bilaterally, good capillary refill in digits of the bilateral upper extremities and no temperature or color changes in the bilateral upper extremities. \par \par Lymphatic: Normal Palpation of lymph nodes in the cervical. \par \par Neurologic: fine motor control in the bilateral upper extremities is intact. Deep Tendon Reflexes in Upper and Lower Extremities Negative Espinoza's in the bilateral upper extremities. The patient is oriented to time, place and person. Sensation to light touch intact in the bilateral upper extremities. Mood and Affect is normal. \par \par Right Shoulder: Inspection of the shoulder/upper arm is as follows: There is a full, pain-free, stable range of motion of the shoulder with normal strength and no tenderness to palpation.  \par  \par \par Left Shoulder: Inspection of the shoulder/upper arm is as follows: Stable shoulder. Palpation of the shoulder/upper arm is as follows: There is tenderness at the AC joint, proximal biceps tendon and supraspinatus tendon. Range of motion of the shoulder is as follows: Pain with internal rotation, external rotation, abduction and forward flexion. Strength of the shoulder is as follows: Supraspinatus 4/5. External Rotation 4/5. Internal Rotation 4/5. Infraspinatus 5/5 4/5. Deltoid 5/5 Ligament Stability and Special Tests of the shoulder is as follows: Neer test is positive. Manzanares' test is positive. Speed's test is positive.\par \par Neck:  Inspection / Palpation of the cervical spine is as follows: There is moderately restricted ROM, increase paraspinal tone and left sided tenderness to palpation. \par \par Back, including spine: Inspection / Palpation of the thoracic/lumbar spine is as follows: There is a full, pain free, stable range of motion of the thoracic spine with a normal tone and not tenderness to palpation.. \par \par  [FreeTextEntry1] : advanced narrowing at C5-C6

## 2022-06-01 NOTE — HISTORY OF PRESENT ILLNESS
[de-identified] : 56yo male presenting to the office with severe left shoulder pain for several months. She admits to significant exacerbation of pain over the last several days. She admits to lateral pain and intermittent tingling into her biceps. Pain is moderate, constant, Denies any injury or trauma. She has noticed increase pain with movement overhead. patient has been taking Tylenol with mild relief.\par 6/1/22: Patient returns today for repeat evaluation of left shoulder and neck pain. She reports severe exacerbation of pain and radiculopathy over the last week. Patient reports pain is constant and severe. Previous subacromial injection provided nearly 3 weeks of mild relief. Patient has been completing PT/HEP.

## 2022-06-17 ENCOUNTER — APPOINTMENT (OUTPATIENT)
Dept: ORTHOPEDIC SURGERY | Facility: CLINIC | Age: 57
End: 2022-06-17

## 2022-06-29 NOTE — ED ADULT NURSE NOTE - NSFALLRSKPSTHSTOCCUR_ED_ALL_ED
decreased safety awareness/losing balance/decreased sequencing ability/decreased weight-shifting ability
Single Mechanical/Accidental Fall

## 2022-11-16 NOTE — PATIENT PROFILE ADULT - NSPROMUTANXFEARFT_GEN_A_NUR
Detail Level: Detailed Additional Notes: Patient has not yet received flu vaccine Quality 110: Preventive Care And Screening: Influenza Immunization: Influenza Immunization not Administered for Documented Reasons. n/a

## 2022-11-18 ENCOUNTER — OFFICE (OUTPATIENT)
Dept: URBAN - METROPOLITAN AREA CLINIC 63 | Facility: CLINIC | Age: 57
Setting detail: OPHTHALMOLOGY
End: 2022-11-18
Payer: MEDICARE

## 2022-11-18 DIAGNOSIS — H35.341: ICD-10-CM

## 2022-11-18 DIAGNOSIS — H43.813: ICD-10-CM

## 2022-11-18 PROCEDURE — 92134 CPTRZ OPH DX IMG PST SGM RTA: CPT | Performed by: SPECIALIST

## 2022-11-18 PROCEDURE — 92014 COMPRE OPH EXAM EST PT 1/>: CPT | Performed by: SPECIALIST

## 2022-11-18 ASSESSMENT — LID POSITION - COMMENTS
OD_COMMENTS: DOING WELL, GOOD HEIGHT AND SYMMETRY
OS_COMMENTS: DOING WELL, GOOD HEIGHT AND SYMMETRY

## 2022-11-18 ASSESSMENT — SUPERFICIAL PUNCTATE KERATITIS (SPK)
OS_SPK: 1+
OD_SPK: 1+

## 2022-11-18 ASSESSMENT — TONOMETRY
OD_IOP_MMHG: 21
OS_IOP_MMHG: 19

## 2022-11-18 ASSESSMENT — LID EXAM ASSESSMENTS
OD_BLEPHARITIS: RUL 2+
OS_BLEPHARITIS: LUL 2+
OS_BROW_PTOSIS: DOING WELL, GOOD POSITION
OD_BROW_PTOSIS: DOING WELL, GOOD POSITION

## 2022-11-18 ASSESSMENT — VISUAL ACUITY
OD_BCVA: 20/20
OS_BCVA: 20/25

## 2022-11-18 ASSESSMENT — CONFRONTATIONAL VISUAL FIELD TEST (CVF)
OS_FINDINGS: FULL
OD_FINDINGS: FULL

## 2023-07-13 ENCOUNTER — OFFICE (OUTPATIENT)
Dept: URBAN - METROPOLITAN AREA CLINIC 104 | Facility: CLINIC | Age: 58
Setting detail: OPHTHALMOLOGY
End: 2023-07-13
Payer: COMMERCIAL

## 2023-07-13 DIAGNOSIS — H52.4: ICD-10-CM

## 2023-07-13 DIAGNOSIS — H01.004: ICD-10-CM

## 2023-07-13 DIAGNOSIS — H43.813: ICD-10-CM

## 2023-07-13 DIAGNOSIS — H01.001: ICD-10-CM

## 2023-07-13 DIAGNOSIS — H16.223: ICD-10-CM

## 2023-07-13 DIAGNOSIS — H35.341: ICD-10-CM

## 2023-07-13 DIAGNOSIS — H25.13: ICD-10-CM

## 2023-07-13 DIAGNOSIS — E11.9: ICD-10-CM

## 2023-07-13 DIAGNOSIS — H57.813: ICD-10-CM

## 2023-07-13 PROCEDURE — 92134 CPTRZ OPH DX IMG PST SGM RTA: CPT | Performed by: OPTOMETRIST

## 2023-07-13 PROCEDURE — 92014 COMPRE OPH EXAM EST PT 1/>: CPT | Performed by: OPTOMETRIST

## 2023-07-13 PROCEDURE — 92015 DETERMINE REFRACTIVE STATE: CPT | Performed by: OPTOMETRIST

## 2023-07-13 ASSESSMENT — CONFRONTATIONAL VISUAL FIELD TEST (CVF)
OS_FINDINGS: FULL
OD_FINDINGS: FULL

## 2023-07-13 ASSESSMENT — SUPERFICIAL PUNCTATE KERATITIS (SPK)
OD_SPK: 1+
OS_SPK: 1+

## 2023-07-13 ASSESSMENT — LID EXAM ASSESSMENTS
OS_BLEPHARITIS: LUL 2+
OD_BLEPHARITIS: RUL 2+
OD_BROW_PTOSIS: DOING WELL, GOOD POSITION
OS_BROW_PTOSIS: DOING WELL, GOOD POSITION

## 2023-07-14 ASSESSMENT — KERATOMETRY
OD_K2POWER_DIOPTERS: 46.36
OS_K2POWER_DIOPTERS: 45.79
OS_K1POWER_DIOPTERS: 45.00
OD_K1POWER_DIOPTERS: 44.06
OS_AXISANGLE_DEGREES: 154
OD_AXISANGLE_DEGREES: 005

## 2023-07-14 ASSESSMENT — REFRACTION_MANIFEST
OS_AXIS: 070
OS_CYLINDER: -2.00
OD_AXIS: 100
OD_CYLINDER: -4.25
OS_VA1: 20/20
OD_SPHERE: +1.00
OD_ADD: +2.00
OS_ADD: +2.00
OS_SPHERE: -0.25
OD_VA1: 20/40-2

## 2023-07-14 ASSESSMENT — VISUAL ACUITY
OS_BCVA: 20/50+1
OD_BCVA: 20/30-2

## 2023-07-14 ASSESSMENT — REFRACTION_AUTOREFRACTION
OS_CYLINDER: -2.00
OS_SPHERE: PLANO
OD_CYLINDER: -4.25
OD_SPHERE: +1.50
OS_AXIS: 065
OD_AXIS: 094

## 2023-07-14 ASSESSMENT — SPHEQUIV_DERIVED
OS_SPHEQUIV: -1.25
OD_SPHEQUIV: -1.125
OD_SPHEQUIV: -0.625

## 2023-07-14 ASSESSMENT — REFRACTION_CURRENTRX
OS_SPHERE: -0.50
OD_AXIS: 099
OS_CYLINDER: -1.75
OS_AXIS: 075
OD_CYLINDER: -3.50
OS_OVR_VA: 20/
OD_SPHERE: -0.25
OD_OVR_VA: 20/

## 2023-07-14 ASSESSMENT — AXIALLENGTH_DERIVED
OD_AL: 23.21
OS_AL: 23.38
OD_AL: 23.4

## 2023-07-19 ENCOUNTER — OFFICE (OUTPATIENT)
Dept: URBAN - METROPOLITAN AREA CLINIC 63 | Facility: CLINIC | Age: 58
Setting detail: OPHTHALMOLOGY
End: 2023-07-19
Payer: COMMERCIAL

## 2023-07-19 DIAGNOSIS — H35.341: ICD-10-CM

## 2023-07-19 DIAGNOSIS — E11.9: ICD-10-CM

## 2023-07-19 DIAGNOSIS — H43.813: ICD-10-CM

## 2023-07-19 PROCEDURE — 92014 COMPRE OPH EXAM EST PT 1/>: CPT | Performed by: SPECIALIST

## 2023-07-19 PROCEDURE — 92134 CPTRZ OPH DX IMG PST SGM RTA: CPT | Performed by: SPECIALIST

## 2023-07-19 ASSESSMENT — LID EXAM ASSESSMENTS
OS_BROW_PTOSIS: DOING WELL, GOOD POSITION
OD_BROW_PTOSIS: DOING WELL, GOOD POSITION
OD_BLEPHARITIS: RUL 2+
OS_BLEPHARITIS: LUL 2+

## 2023-07-19 ASSESSMENT — KERATOMETRY
OS_K2POWER_DIOPTERS: 45.79
OD_AXISANGLE_DEGREES: 005
OD_K2POWER_DIOPTERS: 46.36
OS_AXISANGLE_DEGREES: 154
OD_K1POWER_DIOPTERS: 44.06
OS_K1POWER_DIOPTERS: 45.00

## 2023-07-19 ASSESSMENT — SUPERFICIAL PUNCTATE KERATITIS (SPK)
OD_SPK: 1+
OS_SPK: 1+

## 2023-07-19 ASSESSMENT — VISUAL ACUITY
OD_BCVA: 20/25
OS_BCVA: 20/40-1

## 2023-07-19 ASSESSMENT — REFRACTION_AUTOREFRACTION
OD_AXIS: 094
OS_SPHERE: PLANO
OS_CYLINDER: -2.00
OD_CYLINDER: -4.25
OS_AXIS: 065
OD_SPHERE: +1.50

## 2023-07-19 ASSESSMENT — LID POSITION - COMMENTS
OS_COMMENTS: DOING WELL, GOOD HEIGHT AND SYMMETRY
OD_COMMENTS: DOING WELL, GOOD HEIGHT AND SYMMETRY

## 2023-07-19 ASSESSMENT — SPHEQUIV_DERIVED: OD_SPHEQUIV: -0.625

## 2023-07-19 ASSESSMENT — CONFRONTATIONAL VISUAL FIELD TEST (CVF)
OD_FINDINGS: FULL
OS_FINDINGS: FULL

## 2023-07-19 ASSESSMENT — AXIALLENGTH_DERIVED: OD_AL: 23.21

## 2023-07-19 ASSESSMENT — TONOMETRY
OD_IOP_MMHG: 18
OS_IOP_MMHG: 17

## 2023-08-23 ENCOUNTER — APPOINTMENT (OUTPATIENT)
Dept: OTOLARYNGOLOGY | Facility: CLINIC | Age: 58
End: 2023-08-23
Payer: COMMERCIAL

## 2023-08-23 ENCOUNTER — NON-APPOINTMENT (OUTPATIENT)
Age: 58
End: 2023-08-23

## 2023-08-23 VITALS
HEIGHT: 69 IN | DIASTOLIC BLOOD PRESSURE: 83 MMHG | HEART RATE: 71 BPM | SYSTOLIC BLOOD PRESSURE: 162 MMHG | BODY MASS INDEX: 27.4 KG/M2 | WEIGHT: 185 LBS

## 2023-08-23 DIAGNOSIS — Z82.49 FAMILY HISTORY OF ISCHEMIC HEART DISEASE AND OTHER DISEASES OF THE CIRCULATORY SYSTEM: ICD-10-CM

## 2023-08-23 DIAGNOSIS — D69.9 HEMORRHAGIC CONDITION, UNSPECIFIED: ICD-10-CM

## 2023-08-23 DIAGNOSIS — Z83.3 FAMILY HISTORY OF DIABETES MELLITUS: ICD-10-CM

## 2023-08-23 DIAGNOSIS — Z86.39 PERSONAL HISTORY OF OTHER ENDOCRINE, NUTRITIONAL AND METABOLIC DISEASE: ICD-10-CM

## 2023-08-23 DIAGNOSIS — Z96.89 PRESENCE OF OTHER SPECIFIED FUNCTIONAL IMPLANTS: ICD-10-CM

## 2023-08-23 DIAGNOSIS — Z78.9 OTHER SPECIFIED HEALTH STATUS: ICD-10-CM

## 2023-08-23 PROCEDURE — 99204 OFFICE O/P NEW MOD 45 MIN: CPT | Mod: 25

## 2023-08-23 PROCEDURE — 30903 CONTROL OF NOSEBLEED: CPT | Mod: LT

## 2023-08-23 NOTE — PROCEDURE
[FreeTextEntry1] : Left Nasal Cauterization [FreeTextEntry2] : vascular plexus front left side [FreeTextEntry3] :  Procedure: Nasal Cauterization. Turned off spinal cord stimulator.  After topical 4% xylocaine and oxymetazoline, the nasal vault was re-evaluated. Bleeding site identified. Cauterized with silver nitrate. Post-procedure instructions given. Patient tolerated procedure well

## 2023-08-23 NOTE — REVIEW OF SYSTEMS
[Sneezing] : sneezing [Seasonal Allergies] : seasonal allergies [Hearing Loss] : hearing loss [Dizziness] : dizziness [Vertigo] : vertigo [Ear Noises] : ear noises [Nasal Congestion] : nasal congestion [Nose Bleeds] : nose bleeds [Problem Snoring] : problem snoring [Sinus Pain] : sinus pain [Sinus Pressure] : sinus pressure [Swelling Neck] : swelling neck [Swelling Face] : face swelling [Joint Pain] : joint pain [Anxiety] : anxiety [Negative] : Heme/Lymph [de-identified] : headache [FreeTextEntry1] : sweating at night

## 2023-08-23 NOTE — ADDENDUM
[FreeTextEntry1] : Documented by Melania Braga acting as scribe for Dr. Dutta on 08/23/2023.  All Medical record entries made by the scribe were at my, Dr. Dutta,direction and personally dictated by me on 08/23/2023. I have reviewed the chart and agree that the record accurately reflects my personal performance of the history, physical exam, assessment and plan. I have also personally directed, reviewed, and agreed with the discharge instructions.

## 2023-08-23 NOTE — ASSESSMENT
[FreeTextEntry1] : Reviewed and reconciled medications, allergies, PMHx, PSHx, SocHx, FMHx.  Pt presents today c/o recurrent spontaneous epistaxis, mostly on the left, occasionally on the right.   Physical Exam - nose: vascular plexus front left side, deviated septum left, dry right side of the nose  Procedure: Left Nasal Cauterization  with electro cautery  Plan: Left Nasal Cauterization. Saline gel spray 3-4x per day starting tonight. Don't blow the nose or smear, only dab and throw tissue away. Sneeze with the mouth open. FU prn.

## 2023-08-23 NOTE — HISTORY OF PRESENT ILLNESS
[de-identified] : Pt presents today c/o recurrent spontaneous epistaxis, mostly on the left, occasionally on the right. Denies being on blood thinners, has a spinal cord stimulator. Notes today her nose started bleeding spontaneously when she was folding laundry. Notes when she gets her blood taken, she usually bleeds a lot after. Pt notes when she would get her menstrual cycle, she would bleed heavily and was anemic. Pt notes she has half a thyroid. Denies abnormal bleeding during previous surgeries.

## 2023-08-23 NOTE — PHYSICAL EXAM
[Midline] : trachea located in midline position [Normal] : orientation to person, place, and time: normal [FreeTextEntry1] : vascular plexus front left side, deviated septum left, dry right side of the nose [de-identified] : class 2 active

## 2023-09-08 ENCOUNTER — APPOINTMENT (OUTPATIENT)
Dept: OTOLARYNGOLOGY | Facility: CLINIC | Age: 58
End: 2023-09-08
Payer: COMMERCIAL

## 2023-09-08 VITALS
HEIGHT: 69 IN | SYSTOLIC BLOOD PRESSURE: 147 MMHG | HEART RATE: 69 BPM | BODY MASS INDEX: 28.44 KG/M2 | DIASTOLIC BLOOD PRESSURE: 84 MMHG | WEIGHT: 192 LBS

## 2023-09-08 DIAGNOSIS — J31.0 CHRONIC RHINITIS: ICD-10-CM

## 2023-09-08 DIAGNOSIS — J34.2 DEVIATED NASAL SEPTUM: ICD-10-CM

## 2023-09-08 DIAGNOSIS — R04.0 EPISTAXIS: ICD-10-CM

## 2023-09-08 PROCEDURE — 99213 OFFICE O/P EST LOW 20 MIN: CPT

## 2023-09-08 RX ORDER — LEVOTHYROXINE SODIUM 0.17 MG/1
TABLET ORAL
Refills: 0 | Status: ACTIVE | COMMUNITY

## 2023-09-08 RX ORDER — METHYLPREDNISOLONE 4 MG/1
4 TABLET ORAL
Qty: 1 | Refills: 0 | Status: DISCONTINUED | COMMUNITY
Start: 2022-06-01 | End: 2023-09-08

## 2023-09-08 RX ORDER — METHYLPREDNISOLONE 4 MG/1
4 TABLET ORAL
Qty: 1 | Refills: 0 | Status: DISCONTINUED | COMMUNITY
Start: 2022-06-21 | End: 2023-09-08

## 2023-09-08 RX ORDER — MUPIROCIN 20 MG/G
2 OINTMENT TOPICAL 3 TIMES DAILY
Qty: 1 | Refills: 3 | Status: ACTIVE | COMMUNITY
Start: 2023-09-08 | End: 1900-01-01

## 2023-09-08 RX ORDER — LINACLOTIDE 72 UG/1
CAPSULE, GELATIN COATED ORAL
Refills: 0 | Status: ACTIVE | COMMUNITY

## 2023-09-08 RX ORDER — DIAZEPAM 2 MG/1
2 TABLET ORAL TWICE DAILY
Qty: 4 | Refills: 0 | Status: DISCONTINUED | COMMUNITY
Start: 2022-06-01 | End: 2023-09-08

## 2023-09-08 RX ORDER — OXYCODONE AND ACETAMINOPHEN 5; 325 MG/1; MG/1
5-325 TABLET ORAL EVERY 6 HOURS
Qty: 12 | Refills: 0 | Status: DISCONTINUED | COMMUNITY
Start: 2022-06-21 | End: 2023-09-08

## 2023-09-08 NOTE — HISTORY OF PRESENT ILLNESS
[de-identified] : Pt has a h/o recurrent epistaxis, mostly on the left. Currently, there is no further bleeding. She has a little problem breathing.

## 2023-09-08 NOTE — ADDENDUM
[FreeTextEntry1] : Documented by Janay Koehler acting as a scribe for Dr. Dutta on [mm//dd/yyyy].   All Medical record entries made by the scribe were at my, Dr. Dutta, direction and personally directed by me on 09/08/2023. I have reviewed the chart and agree that the record accurately reflects my personal performance of the history, physical exam, assessment, and plan. I have also personally directed, reviewed, and agreed with the discharge instructions.

## 2023-09-08 NOTE — ASSESSMENT
[FreeTextEntry1] : Reviewed and Reconciled the pmhx, fmhx, sochx, and medhx Pt has a h/o recurrent epistaxis, mostly on the left. Currently, there is no further bleeding. She has a little problem breathing.   Physical Exam: nasal spine tender, radiates to upper teeth large scab crust, dry blood, left   Plan: Gel spray then bactroban TDS. FU 3 -weeks.

## 2023-09-08 NOTE — PHYSICAL EXAM
[FreeTextEntry1] : sensations intact sinuses nontender to percussion  [de-identified] : nasal spine tender, radiates to upper teeth [de-identified] : large scab crust, dry blood, left   [Normal] : orientation to person, place, and time: normal

## 2023-10-07 ENCOUNTER — NON-APPOINTMENT (OUTPATIENT)
Age: 58
End: 2023-10-07

## 2023-10-10 ENCOUNTER — APPOINTMENT (OUTPATIENT)
Dept: OTOLARYNGOLOGY | Facility: CLINIC | Age: 58
End: 2023-10-10

## 2023-11-20 NOTE — H&P ADULT - NSICDXNOPASTSURGICALHX_GEN_ALL_CORE
verbalizes understanding/demonstrates understanding of teaching
<-- Click to add NO significant Past Surgical History

## 2024-01-07 ENCOUNTER — NON-APPOINTMENT (OUTPATIENT)
Age: 59
End: 2024-01-07

## 2024-03-22 ENCOUNTER — OFFICE (OUTPATIENT)
Dept: URBAN - METROPOLITAN AREA CLINIC 104 | Facility: CLINIC | Age: 59
Setting detail: OPHTHALMOLOGY
End: 2024-03-22
Payer: COMMERCIAL

## 2024-03-22 DIAGNOSIS — H35.341: ICD-10-CM

## 2024-03-22 DIAGNOSIS — E11.9: ICD-10-CM

## 2024-03-22 DIAGNOSIS — H43.813: ICD-10-CM

## 2024-03-22 PROCEDURE — 92134 CPTRZ OPH DX IMG PST SGM RTA: CPT | Performed by: SPECIALIST

## 2024-03-22 PROCEDURE — 92014 COMPRE OPH EXAM EST PT 1/>: CPT | Performed by: SPECIALIST

## 2024-03-22 ASSESSMENT — LID EXAM ASSESSMENTS
OD_BLEPHARITIS: RUL 2+
OS_BROW_PTOSIS: DOING WELL, GOOD POSITION
OD_BROW_PTOSIS: DOING WELL, GOOD POSITION
OS_BLEPHARITIS: LUL 2+

## 2024-04-05 ENCOUNTER — NON-APPOINTMENT (OUTPATIENT)
Age: 59
End: 2024-04-05

## 2024-07-06 ENCOUNTER — NON-APPOINTMENT (OUTPATIENT)
Age: 59
End: 2024-07-06

## 2024-08-23 ENCOUNTER — NON-APPOINTMENT (OUTPATIENT)
Age: 59
End: 2024-08-23

## 2024-09-02 ENCOUNTER — TRANSCRIPTION ENCOUNTER (OUTPATIENT)
Age: 59
End: 2024-09-02

## 2024-09-05 ENCOUNTER — APPOINTMENT (OUTPATIENT)
Dept: UROLOGY | Facility: CLINIC | Age: 59
End: 2024-09-05
Payer: COMMERCIAL

## 2024-09-05 ENCOUNTER — NON-APPOINTMENT (OUTPATIENT)
Age: 59
End: 2024-09-05

## 2024-09-05 DIAGNOSIS — N28.89 OTHER SPECIFIED DISORDERS OF KIDNEY AND URETER: ICD-10-CM

## 2024-09-05 PROCEDURE — 99204 OFFICE O/P NEW MOD 45 MIN: CPT

## 2024-09-09 ENCOUNTER — NON-APPOINTMENT (OUTPATIENT)
Age: 59
End: 2024-09-09

## 2024-09-19 NOTE — PROGRESS NOTE ADULT - ASSESSMENT
53 yo F with hx of Partial thyroidectomy c/b hypothyroidism,  s/p back fusion ,  admitted for evaluation of  infection in her left lower leg. Recently in [facility]. She was treated for lacerations and discharged with outpatient followup . Now in [facility] for increased swelling, pain and discharge in the left lower leg from likely a fall from a ladder. Cultures obtained in outpatient office. Now admitted for treatment and I+D of the left leg with new found Foreign body.    # Cellulitis and Laceration of LLE  s/p I/D via ortho  id following  s/p vanco and zosyn : off vanco:: now changed to vantin  preliminary cx positive for gram negative rods  wound vac    # hx of Partial Thyroidectomy now with hypothyroidism 	  - Synthroid 137 mcg.     #DVT prophylaxis   will change to lovenox    At this point:; Initiate dispo planning with case management and ortho Opt out

## 2024-10-01 ENCOUNTER — RX ONLY (RX ONLY)
Age: 59
End: 2024-10-01

## 2024-10-01 ENCOUNTER — OFFICE (OUTPATIENT)
Dept: URBAN - METROPOLITAN AREA CLINIC 104 | Facility: CLINIC | Age: 59
Setting detail: OPHTHALMOLOGY
End: 2024-10-01
Payer: COMMERCIAL

## 2024-10-01 DIAGNOSIS — H25.13: ICD-10-CM

## 2024-10-01 DIAGNOSIS — H35.361: ICD-10-CM

## 2024-10-01 DIAGNOSIS — H52.4: ICD-10-CM

## 2024-10-01 DIAGNOSIS — H43.813: ICD-10-CM

## 2024-10-01 PROCEDURE — 92015 DETERMINE REFRACTIVE STATE: CPT | Performed by: OPTOMETRIST

## 2024-10-01 PROCEDURE — 92014 COMPRE OPH EXAM EST PT 1/>: CPT | Performed by: OPTOMETRIST

## 2024-10-01 PROCEDURE — 92250 FUNDUS PHOTOGRAPHY W/I&R: CPT | Performed by: OPTOMETRIST

## 2024-10-01 ASSESSMENT — VISUAL ACUITY
OS_BCVA: 20/200
OD_BCVA: 20/70

## 2024-10-01 ASSESSMENT — REFRACTION_MANIFEST
OD_VA1: 20/40+2
OD_VA2: 20/20(J1+)
OD_AXIS: 100
OS_CYLINDER: -2.00
OS_SPHERE: -0.50
OD_CYLINDER: -4.25
OD_SPHERE: +1.00
OD_ADD: +2.25
OS_VA1: 20/25-1
OS_AXIS: 075
OS_ADD: +2.25
OS_VA2: 20/20(J1+)

## 2024-10-01 ASSESSMENT — KERATOMETRY
OS_K1POWER_DIOPTERS: 44.82
OS_AXISANGLE_DEGREES: 172
OS_K2POWER_DIOPTERS: 46.11
OD_K2POWER_DIOPTERS: 46.62
OD_AXISANGLE_DEGREES: 004
OD_K1POWER_DIOPTERS: 43.89

## 2024-10-01 ASSESSMENT — TONOMETRY
OD_IOP_MMHG: 20
OS_IOP_MMHG: 20

## 2024-10-01 ASSESSMENT — REFRACTION_AUTOREFRACTION
OS_AXIS: 069
OD_SPHERE: +1.50
OS_SPHERE: +0.50
OS_CYLINDER: -2.50
OD_CYLINDER: -4.25
OD_AXIS: 101

## 2024-10-01 ASSESSMENT — LID EXAM ASSESSMENTS
OD_BROW_PTOSIS: DOING WELL, GOOD POSITION
OD_BLEPHARITIS: RUL 2+
OS_BROW_PTOSIS: DOING WELL, GOOD POSITION
OS_BLEPHARITIS: LUL 2+

## 2024-10-01 ASSESSMENT — SUPERFICIAL PUNCTATE KERATITIS (SPK)
OS_SPK: 1+
OD_SPK: 1+

## 2024-10-01 ASSESSMENT — CONFRONTATIONAL VISUAL FIELD TEST (CVF)
OS_FINDINGS: FULL
OD_FINDINGS: FULL

## 2024-10-16 ENCOUNTER — OUTPATIENT (OUTPATIENT)
Dept: OUTPATIENT SERVICES | Facility: HOSPITAL | Age: 59
LOS: 1 days | End: 2024-10-16

## 2024-10-16 VITALS
DIASTOLIC BLOOD PRESSURE: 83 MMHG | WEIGHT: 182.98 LBS | SYSTOLIC BLOOD PRESSURE: 140 MMHG | HEART RATE: 70 BPM | HEIGHT: 69 IN | TEMPERATURE: 98 F | RESPIRATION RATE: 18 BRPM | OXYGEN SATURATION: 99 %

## 2024-10-16 DIAGNOSIS — Z90.711 ACQUIRED ABSENCE OF UTERUS WITH REMAINING CERVICAL STUMP: Chronic | ICD-10-CM

## 2024-10-16 DIAGNOSIS — E03.9 HYPOTHYROIDISM, UNSPECIFIED: ICD-10-CM

## 2024-10-16 DIAGNOSIS — Z90.49 ACQUIRED ABSENCE OF OTHER SPECIFIED PARTS OF DIGESTIVE TRACT: Chronic | ICD-10-CM

## 2024-10-16 DIAGNOSIS — Z96.89 PRESENCE OF OTHER SPECIFIED FUNCTIONAL IMPLANTS: Chronic | ICD-10-CM

## 2024-10-16 DIAGNOSIS — E11.9 TYPE 2 DIABETES MELLITUS WITHOUT COMPLICATIONS: ICD-10-CM

## 2024-10-16 DIAGNOSIS — Z87.19 PERSONAL HISTORY OF OTHER DISEASES OF THE DIGESTIVE SYSTEM: ICD-10-CM

## 2024-10-16 DIAGNOSIS — Z98.1 ARTHRODESIS STATUS: Chronic | ICD-10-CM

## 2024-10-16 DIAGNOSIS — N28.89 OTHER SPECIFIED DISORDERS OF KIDNEY AND URETER: ICD-10-CM

## 2024-10-16 DIAGNOSIS — Z98.890 OTHER SPECIFIED POSTPROCEDURAL STATES: Chronic | ICD-10-CM

## 2024-10-16 DIAGNOSIS — Z90.09 ACQUIRED ABSENCE OF OTHER PART OF HEAD AND NECK: Chronic | ICD-10-CM

## 2024-10-16 LAB
A1C WITH ESTIMATED AVERAGE GLUCOSE RESULT: 5.5 % — SIGNIFICANT CHANGE UP (ref 4–5.6)
ANION GAP SERPL CALC-SCNC: 14 MMOL/L — SIGNIFICANT CHANGE UP (ref 7–14)
BASOPHILS # BLD AUTO: 0.06 K/UL — SIGNIFICANT CHANGE UP (ref 0–0.2)
BASOPHILS NFR BLD AUTO: 1.1 % — SIGNIFICANT CHANGE UP (ref 0–2)
BLD GP AB SCN SERPL QL: NEGATIVE — SIGNIFICANT CHANGE UP
BUN SERPL-MCNC: 14 MG/DL — SIGNIFICANT CHANGE UP (ref 7–23)
CALCIUM SERPL-MCNC: 9.8 MG/DL — SIGNIFICANT CHANGE UP (ref 8.4–10.5)
CHLORIDE SERPL-SCNC: 102 MMOL/L — SIGNIFICANT CHANGE UP (ref 98–107)
CO2 SERPL-SCNC: 25 MMOL/L — SIGNIFICANT CHANGE UP (ref 22–31)
CREAT SERPL-MCNC: 0.62 MG/DL — SIGNIFICANT CHANGE UP (ref 0.5–1.3)
EGFR: 103 ML/MIN/1.73M2 — SIGNIFICANT CHANGE UP
EOSINOPHIL # BLD AUTO: 0.16 K/UL — SIGNIFICANT CHANGE UP (ref 0–0.5)
EOSINOPHIL NFR BLD AUTO: 2.8 % — SIGNIFICANT CHANGE UP (ref 0–6)
ESTIMATED AVERAGE GLUCOSE: 111 — SIGNIFICANT CHANGE UP
GLUCOSE SERPL-MCNC: 85 MG/DL — SIGNIFICANT CHANGE UP (ref 70–99)
HCT VFR BLD CALC: 41.6 % — SIGNIFICANT CHANGE UP (ref 34.5–45)
HGB BLD-MCNC: 13.5 G/DL — SIGNIFICANT CHANGE UP (ref 11.5–15.5)
IMM GRANULOCYTES NFR BLD AUTO: 0.2 % — SIGNIFICANT CHANGE UP (ref 0–0.9)
LYMPHOCYTES # BLD AUTO: 2.42 K/UL — SIGNIFICANT CHANGE UP (ref 1–3.3)
LYMPHOCYTES # BLD AUTO: 42.5 % — SIGNIFICANT CHANGE UP (ref 13–44)
MCHC RBC-ENTMCNC: 30.1 PG — SIGNIFICANT CHANGE UP (ref 27–34)
MCHC RBC-ENTMCNC: 32.5 GM/DL — SIGNIFICANT CHANGE UP (ref 32–36)
MCV RBC AUTO: 92.9 FL — SIGNIFICANT CHANGE UP (ref 80–100)
MONOCYTES # BLD AUTO: 0.51 K/UL — SIGNIFICANT CHANGE UP (ref 0–0.9)
MONOCYTES NFR BLD AUTO: 9 % — SIGNIFICANT CHANGE UP (ref 2–14)
NEUTROPHILS # BLD AUTO: 2.53 K/UL — SIGNIFICANT CHANGE UP (ref 1.8–7.4)
NEUTROPHILS NFR BLD AUTO: 44.4 % — SIGNIFICANT CHANGE UP (ref 43–77)
PLATELET # BLD AUTO: 285 K/UL — SIGNIFICANT CHANGE UP (ref 150–400)
POTASSIUM SERPL-MCNC: 3.8 MMOL/L — SIGNIFICANT CHANGE UP (ref 3.5–5.3)
POTASSIUM SERPL-SCNC: 3.8 MMOL/L — SIGNIFICANT CHANGE UP (ref 3.5–5.3)
RBC # BLD: 4.48 M/UL — SIGNIFICANT CHANGE UP (ref 3.8–5.2)
RBC # FLD: 13.2 % — SIGNIFICANT CHANGE UP (ref 10.3–14.5)
RH IG SCN BLD-IMP: POSITIVE — SIGNIFICANT CHANGE UP
RH IG SCN BLD-IMP: POSITIVE — SIGNIFICANT CHANGE UP
SODIUM SERPL-SCNC: 141 MMOL/L — SIGNIFICANT CHANGE UP (ref 135–145)
WBC # BLD: 5.69 K/UL — SIGNIFICANT CHANGE UP (ref 3.8–10.5)
WBC # FLD AUTO: 5.69 K/UL — SIGNIFICANT CHANGE UP (ref 3.8–10.5)

## 2024-10-16 RX ORDER — LINACLOTIDE 72 UG/1
1 CAPSULE, GELATIN COATED ORAL
Refills: 0 | DISCHARGE

## 2024-10-16 NOTE — H&P PST ADULT - PROBLEM SELECTOR PLAN 1
now scheduled for single port robotic assisted right partial nephrectomy tentatively for 10/28/24.  Verbal and written pre-op instructions provided to patient. Patient verbalized understanding and will call surgeons office for revised instructions if surgery is rescheduled. Surgical scrub given with instructions.  Laps pending. Now scheduled for single port robotic assisted right partial nephrectomy tentatively for 10/28/24.  Verbal and written pre-op instructions provided to patient. Patient verbalized understanding and will call surgeons office for revised instructions if surgery is rescheduled. Surgical scrub given with instructions.  Laps pending.

## 2024-10-16 NOTE — H&P PST ADULT - NEGATIVE NEUROLOGICAL SYMPTOMS
no weakness/no paresthesias/no syncope/no tremors/no vertigo/no loss of sensation/no difficulty walking

## 2024-10-16 NOTE — H&P PST ADULT - HISTORY OF PRESENT ILLNESS
60 y/o female with hx T2 DM. Hypothyroidism. IBS, presents for preop evaluation for  regarding her recently diagnosed  renal cell carcinoma which incidentally revealed in the MRI of her lumbar spine. Pt was referred to dr Solorio for further evaluation; now scheduled for single port robotic assisted right partial nephrectomy tentatively for 10/28/24.

## 2024-10-16 NOTE — H&P PST ADULT - I HAVE PERSONALLY SEEN AND EXAMINED THE PATIENT. THERE HAVE NOT BEEN ANY CHANGES IN THE PATIENT'S HISTORY OR EXAM UNLESS COMMENTED BELOW
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 Pt hospial course at [facility] includes  Septic shock,  acute hypoxic respiratory failure 2/2 COVID-19 PNA, YUKI with Rhabdomyolysis. On abx,  Now tolerating 2 L O2,  rhabdo improving with IVF, but pt remains in YUKI and Is/Os consistent with  insufficient urine output. Schwab in place  (14 Jan 2021 03:09)    RRT  called today for hematochezia. The patient received 2 units of PRBC, 1 unit of platelets and 1 unit of FFP. The patient is hemodynamically stable at thsi time.     PAST MEDICAL & SURGICAL HISTORY:  HLD (hyperlipidemia)    H/O: HTN (hypertension)    H/O adrenal insufficiency        FAMILY HISTORY:      SOCIAL HISTORY:  Smoking: __ packs x ___ years  EtOH Use:  Marital Status:  Occupation:  Recent Travel:  Country of Birth:  Advance Directives:    Allergies    No Known Allergies    Intolerances        HOME MEDICATIONS:    REVIEW OF SYSTEMS:  CONSTITUTIONAL: No fever, chills, night sweats, or fatigue  EYES: No eye pain, visual disturbances, or discharge  ENMT:  No difficulty hearing, tinnitus, vertigo; No sinus or throat pain  RESPIRATORY: No cough, wheezing, or hemoptysis; No shortness of breath  CARDIOVASCULAR: No chest pain, palpitations, dizziness, or leg swelling  GASTROINTESTINAL: No abdominal or epigastric pain. No nausea, vomiting, or hematemesis; No diarrhea or constipation. No melena or hematochezia.  GENITOURINARY: No dysuria, frequency, hematuria, or incontinence  NEUROLOGICAL: No headaches, memory loss, loss of strength, numbness, or tremors  SKIN: No itching, burning, rashes, or lesions   LYMPH NODES: No enlarged glands  ENDOCRINE: No heat or cold intolerance; No hair loss  MUSCULOSKELETAL: No joint pain or swelling; No muscle, back, or extremity pain  PSYCHIATRIC: No depression, anxiety, mood swings, or difficulty sleeping  HEME/LYMPH: No easy bruising, or bleeding gums        OBJECTIVE:  ICU Vital Signs Last 24 Hrs  T(C): 36.6 (25 Jan 2021 13:30), Max: 37.2 (24 Jan 2021 22:51)  T(F): 97.8 (25 Jan 2021 13:30), Max: 99 (24 Jan 2021 22:51)  HR: 90 (25 Jan 2021 13:30) (90 - 95)  BP: 118/54 (25 Jan 2021 13:30) (116/52 - 155/77)  BP(mean): 80 (25 Jan 2021 03:33) (80 - 94)  ABP: --  ABP(mean): --  RR: 18 (25 Jan 2021 13:30) (18 - 20)  SpO2: 100% (25 Jan 2021 13:30) (100% - 100%)        01-24 @ 07:01 - 01-25 @ 07:00  --------------------------------------------------------  IN: 0 mL / OUT: 1500 mL / NET: -1500 mL    01-25 @ 07:01 - 01-25 @ 15:44  --------------------------------------------------------  IN: 1200 mL / OUT: 2100 mL / NET: -900 mL      CAPILLARY BLOOD GLUCOSE      POCT Blood Glucose.: 132 mg/dL (25 Jan 2021 13:25)      PHYSICAL EXAM:  GENERAL: NAD, well-groomed, well-developed  EYES: EOMI, PERRLA, conjunctiva and sclera clear  ENMT: No tonsillar erythema, exudates, or enlargement; Moist mucous membranes, Good dentition, No lesions  CHEST/LUNG: Clear to auscultation bilaterally; No rales, rhonchi, wheezing, or rubs  HEART: Regular rate and rhythm; No murmurs, rubs, or gallops  ABDOMEN: Soft, Nontender, Nondistended; Bowel sounds present; large amount of bloody stools   VASCULAR:  2+ Peripheral Pulses, No clubbing, cyanosis, or edema  LYMPH: No lymphadenopathy noted  SKIN: No rashes or lesions  NERVOUS SYSTEM:  Alert & Oriented X3, Good concentration;    HOSPITAL MEDICATIONS:  MEDICATIONS  (STANDING):  amLODIPine   Tablet 5 milliGRAM(s) Oral daily  atorvastatin 20 milliGRAM(s) Oral at bedtime  calcium acetate 2001 milliGRAM(s) Oral three times a day with meals  desmopressin IVPB 25 MICROGram(s) IV Intermittent once  dextrose 40% Gel 15 Gram(s) Oral once  dextrose 5%. 1000 milliLiter(s) (50 mL/Hr) IV Continuous <Continuous>  dextrose 5%. 1000 milliLiter(s) (100 mL/Hr) IV Continuous <Continuous>  dextrose 5%. 1000 milliLiter(s) (70 mL/Hr) IV Continuous <Continuous>  dextrose 50% Injectable 25 Gram(s) IV Push once  dextrose 50% Injectable 12.5 Gram(s) IV Push once  dextrose 50% Injectable 25 Gram(s) IV Push once  glucagon  Injectable 1 milliGRAM(s) IntraMuscular once  hydrocortisone sodium succinate Injectable 25 milliGRAM(s) IV Push every 12 hours  levothyroxine Injectable 57 MICROGram(s) IV Push <User Schedule>  pantoprazole Infusion 8 mG/Hr (10 mL/Hr) IV Continuous <Continuous>  sodium bicarbonate 1300 milliGRAM(s) Oral two times a day  sodium chloride 0.9% Bolus 1000 milliLiter(s) IV Bolus once    MEDICATIONS  (PRN):      LABS:                        7.9    17.44 )-----------( 453      ( 25 Jan 2021 13:50 )             24.3     01-25    153<H>  |  112<H>  |  99<H>  ----------------------------<  112<H>  3.6   |  23  |  4.15<H>    Ca    7.4<L>      25 Jan 2021 13:50  Phos  6.8     01-25  Mg     1.6     01-25    TPro  5.9<L>  /  Alb  2.0<L>  /  TBili  1.3<H>  /  DBili  x   /  AST  99<H>  /  ALT  83<H>  /  AlkPhos  95  01-25    PT/INR - ( 25 Jan 2021 15:03 )   PT: 14.1 sec;   INR: 1.25 ratio         PTT - ( 25 Jan 2021 15:03 )  PTT:19.2 sec          MICROBIOLOGY:     RADIOLOGY:  [ ] Reviewed and interpreted by me    EKG: Statement Selected

## 2024-10-16 NOTE — H&P PST ADULT - NSICDXPASTMEDICALHX_GEN_ALL_CORE_FT
PAST MEDICAL HISTORY:  DM (diabetes mellitus)     Hypothyroidism     IBS (irritable bowel syndrome)

## 2024-10-16 NOTE — H&P PST ADULT - RESPIRATORY
normal/clear to auscultation bilaterally/no wheezes/no rales/no rhonchi normal/clear to auscultation bilaterally/no wheezes/no rales/no rhonchi/airway patent/breath sounds equal

## 2024-10-16 NOTE — H&P PST ADULT - MUSCULOSKELETAL COMMENTS
Hx lumbar fusion - Spinal cord stimulator in place - 2019 Hx lumbar fusion x 2 - Spinal cord stimulator in place - 2019
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
## 2024-10-16 NOTE — H&P PST ADULT - GENITOURINARY COMMENTS
hx right kidney mass right renal mass hx right kidney mass - incendiary finding on MRI of spine hx right kidney mass - incidental  finding on MRI of spine

## 2024-10-16 NOTE — H&P PST ADULT - NSICDXFAMILYHX_GEN_ALL_CORE_FT
FAMILY HISTORY:  Father  Still living? No  Family history of premature CAD, Age at diagnosis: Age Unknown    Mother  Still living? No  Family history of skin cancer, Age at diagnosis: Age Unknown  FH: HTN (hypertension), Age at diagnosis: Age Unknown    Aunt  Still living? Yes, Estimated age: Age Unknown  FHx: breast cancer, Age at diagnosis: Age Unknown

## 2024-10-16 NOTE — H&P PST ADULT - GENITOURINARY FEMALE
Emergency Department    [address] [place] [id number]    Phone:  [phone]    Fax:  [phone]                                       Sarah Escobar   MRN: [id number]    Department:   Emergency Department   Date of Visit:  2/23/2017           After Visit Summary Signature Page     I have received my discharge instructions, and my questions have been answered. I have discussed any challenges I see with this plan with the nurse or doctor.    ..........................................................................................................................................  Patient/Patient Representative Signature      ..........................................................................................................................................  Patient Representative Print Name and Relationship to Patient    ..................................................               ................................................  Date                                            Time    ..........................................................................................................................................  Reviewed by Signature/Title    ...................................................              ..............................................  Date                                                            Time           normal external genitalia/no discharge/no mass/no tenderness/no ulcer/normal
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
## 2024-10-28 ENCOUNTER — APPOINTMENT (OUTPATIENT)
Dept: UROLOGY | Facility: HOSPITAL | Age: 59
End: 2024-10-28

## 2024-10-28 ENCOUNTER — TRANSCRIPTION ENCOUNTER (OUTPATIENT)
Age: 59
End: 2024-10-28

## 2024-10-28 ENCOUNTER — INPATIENT (INPATIENT)
Facility: HOSPITAL | Age: 59
LOS: 2 days | Discharge: HOME | End: 2024-10-31
Attending: UROLOGY | Admitting: UROLOGY
Payer: COMMERCIAL

## 2024-10-28 ENCOUNTER — RESULT REVIEW (OUTPATIENT)
Age: 59
End: 2024-10-28

## 2024-10-28 VITALS
WEIGHT: 182.98 LBS | HEART RATE: 69 BPM | HEIGHT: 69 IN | OXYGEN SATURATION: 100 % | SYSTOLIC BLOOD PRESSURE: 133 MMHG | DIASTOLIC BLOOD PRESSURE: 74 MMHG | TEMPERATURE: 98 F

## 2024-10-28 DIAGNOSIS — Z98.890 OTHER SPECIFIED POSTPROCEDURAL STATES: Chronic | ICD-10-CM

## 2024-10-28 DIAGNOSIS — Z90.49 ACQUIRED ABSENCE OF OTHER SPECIFIED PARTS OF DIGESTIVE TRACT: Chronic | ICD-10-CM

## 2024-10-28 DIAGNOSIS — Z98.1 ARTHRODESIS STATUS: Chronic | ICD-10-CM

## 2024-10-28 DIAGNOSIS — Z96.89 PRESENCE OF OTHER SPECIFIED FUNCTIONAL IMPLANTS: Chronic | ICD-10-CM

## 2024-10-28 DIAGNOSIS — N28.89 OTHER SPECIFIED DISORDERS OF KIDNEY AND URETER: ICD-10-CM

## 2024-10-28 DIAGNOSIS — Z90.711 ACQUIRED ABSENCE OF UTERUS WITH REMAINING CERVICAL STUMP: Chronic | ICD-10-CM

## 2024-10-28 DIAGNOSIS — R73.03 PREDIABETES: ICD-10-CM

## 2024-10-28 DIAGNOSIS — E11.9 TYPE 2 DIABETES MELLITUS WITHOUT COMPLICATIONS: ICD-10-CM

## 2024-10-28 DIAGNOSIS — Z90.09 ACQUIRED ABSENCE OF OTHER PART OF HEAD AND NECK: Chronic | ICD-10-CM

## 2024-10-28 LAB
GLUCOSE BLDC GLUCOMTR-MCNC: 111 MG/DL — HIGH (ref 70–99)
GLUCOSE BLDC GLUCOMTR-MCNC: 116 MG/DL — HIGH (ref 70–99)
GLUCOSE BLDC GLUCOMTR-MCNC: 128 MG/DL — HIGH (ref 70–99)
GLUCOSE BLDC GLUCOMTR-MCNC: 131 MG/DL — HIGH (ref 70–99)
GLUCOSE BLDC GLUCOMTR-MCNC: 194 MG/DL — HIGH (ref 70–99)

## 2024-10-28 PROCEDURE — 88307 TISSUE EXAM BY PATHOLOGIST: CPT | Mod: 26

## 2024-10-28 PROCEDURE — 88305 TISSUE EXAM BY PATHOLOGIST: CPT | Mod: 26

## 2024-10-28 PROCEDURE — 76998 US GUIDE INTRAOP: CPT | Mod: 26

## 2024-10-28 PROCEDURE — 88331 PATH CONSLTJ SURG 1 BLK 1SPC: CPT | Mod: 26

## 2024-10-28 PROCEDURE — 50543 LAPARO PARTIAL NEPHRECTOMY: CPT | Mod: RT

## 2024-10-28 PROCEDURE — 99222 1ST HOSP IP/OBS MODERATE 55: CPT

## 2024-10-28 DEVICE — SURGICEL NU-KNIT 6 X 9": Type: IMPLANTABLE DEVICE | Site: RIGHT | Status: FUNCTIONAL

## 2024-10-28 DEVICE — LIGATING CLIPS WECK HEMOLOK POLYMER LARGE (PURPLE) 6: Type: IMPLANTABLE DEVICE | Site: RIGHT | Status: FUNCTIONAL

## 2024-10-28 DEVICE — CLIP APPLIER COVIDIEN ENDOCLIP II 10MM MED/LG: Type: IMPLANTABLE DEVICE | Site: RIGHT | Status: FUNCTIONAL

## 2024-10-28 DEVICE — SURGICEL 2 X 14": Type: IMPLANTABLE DEVICE | Site: RIGHT | Status: FUNCTIONAL

## 2024-10-28 RX ORDER — SENNA 187 MG
2 TABLET ORAL AT BEDTIME
Refills: 0 | Status: DISCONTINUED | OUTPATIENT
Start: 2024-10-28 | End: 2024-10-31

## 2024-10-28 RX ORDER — LEVOTHYROXINE SODIUM 88 MCG
137 TABLET ORAL DAILY
Refills: 0 | Status: DISCONTINUED | OUTPATIENT
Start: 2024-10-28 | End: 2024-10-31

## 2024-10-28 RX ORDER — METFORMIN HYDROCHLORIDE 500 MG/1
500 TABLET, EXTENDED RELEASE ORAL
Refills: 0 | Status: DISCONTINUED | OUTPATIENT
Start: 2024-10-28 | End: 2024-10-28

## 2024-10-28 RX ORDER — ONDANSETRON HYDROCHLORIDE 2 MG/ML
4 INJECTION, SOLUTION INTRAMUSCULAR; INTRAVENOUS ONCE
Refills: 0 | Status: COMPLETED | OUTPATIENT
Start: 2024-10-28 | End: 2024-10-28

## 2024-10-28 RX ORDER — KETOROLAC TROMETHAMINE 30 MG/ML
15 INJECTION INTRAMUSCULAR; INTRAVENOUS EVERY 8 HOURS
Refills: 0 | Status: DISCONTINUED | OUTPATIENT
Start: 2024-10-28 | End: 2024-10-30

## 2024-10-28 RX ORDER — ONDANSETRON HYDROCHLORIDE 2 MG/ML
4 INJECTION, SOLUTION INTRAMUSCULAR; INTRAVENOUS EVERY 8 HOURS
Refills: 0 | Status: DISCONTINUED | OUTPATIENT
Start: 2024-10-28 | End: 2024-10-31

## 2024-10-28 RX ORDER — ACETAMINOPHEN 500 MG
975 TABLET ORAL EVERY 6 HOURS
Refills: 0 | Status: DISCONTINUED | OUTPATIENT
Start: 2024-10-28 | End: 2024-10-31

## 2024-10-28 RX ORDER — DIPHENHYDRAMINE HCL 12.5MG/5ML
25 ELIXIR ORAL ONCE
Refills: 0 | Status: COMPLETED | OUTPATIENT
Start: 2024-10-28 | End: 2024-10-28

## 2024-10-28 RX ORDER — GLUCAGON INJECTION, SOLUTION 1 MG/.2ML
1 INJECTION, SOLUTION SUBCUTANEOUS ONCE
Refills: 0 | Status: DISCONTINUED | OUTPATIENT
Start: 2024-10-28 | End: 2024-10-31

## 2024-10-28 RX ORDER — LINACLOTIDE 72 UG/1
1 CAPSULE, GELATIN COATED ORAL
Refills: 0 | DISCHARGE

## 2024-10-28 RX ORDER — HEPARIN SODIUM 10000 [USP'U]/ML
5000 INJECTION INTRAVENOUS; SUBCUTANEOUS EVERY 8 HOURS
Refills: 0 | Status: DISCONTINUED | OUTPATIENT
Start: 2024-10-28 | End: 2024-10-29

## 2024-10-28 RX ORDER — LINACLOTIDE 290 UG/1
290 CAPSULE, GELATIN COATED ORAL
Refills: 0 | Status: DISCONTINUED | OUTPATIENT
Start: 2024-10-28 | End: 2024-10-31

## 2024-10-28 RX ORDER — ACETAMINOPHEN 500 MG
1000 TABLET ORAL ONCE
Refills: 0 | Status: COMPLETED | OUTPATIENT
Start: 2024-10-28 | End: 2024-10-28

## 2024-10-28 RX ORDER — FAMOTIDINE 10 MG/ML
20 INJECTION INTRAVENOUS ONCE
Refills: 0 | Status: DISCONTINUED | OUTPATIENT
Start: 2024-10-28 | End: 2024-10-31

## 2024-10-28 RX ORDER — INSULIN LISPRO 100/ML
VIAL (ML) SUBCUTANEOUS
Refills: 0 | Status: DISCONTINUED | OUTPATIENT
Start: 2024-10-28 | End: 2024-10-30

## 2024-10-28 RX ORDER — FENTANYL CITRAT/DEXTROSE 5%/PF 1250MCG/50
25 PATIENT CONTROLLED ANALGESIA SYRINGE INTRAVENOUS
Refills: 0 | Status: DISCONTINUED | OUTPATIENT
Start: 2024-10-28 | End: 2024-10-28

## 2024-10-28 RX ORDER — INSULIN LISPRO 100/ML
VIAL (ML) SUBCUTANEOUS AT BEDTIME
Refills: 0 | Status: DISCONTINUED | OUTPATIENT
Start: 2024-10-28 | End: 2024-10-30

## 2024-10-28 RX ORDER — METFORMIN HCL 500 MG
1 TABLET ORAL
Refills: 0 | DISCHARGE

## 2024-10-28 RX ADMIN — Medication 1000 MILLIGRAM(S): at 12:34

## 2024-10-28 RX ADMIN — Medication 25 MICROGRAM(S): at 11:30

## 2024-10-28 RX ADMIN — KETOROLAC TROMETHAMINE 15 MILLIGRAM(S): 30 INJECTION INTRAMUSCULAR; INTRAVENOUS at 23:35

## 2024-10-28 RX ADMIN — Medication 975 MILLIGRAM(S): at 18:18

## 2024-10-28 RX ADMIN — Medication 25 MICROGRAM(S): at 11:20

## 2024-10-28 RX ADMIN — ONDANSETRON HYDROCHLORIDE 4 MILLIGRAM(S): 2 INJECTION, SOLUTION INTRAMUSCULAR; INTRAVENOUS at 12:57

## 2024-10-28 RX ADMIN — Medication 125 MILLILITER(S): at 11:10

## 2024-10-28 RX ADMIN — Medication 400 MILLIGRAM(S): at 12:19

## 2024-10-28 RX ADMIN — Medication 1: at 11:10

## 2024-10-28 RX ADMIN — Medication 25 MICROGRAM(S): at 12:05

## 2024-10-28 RX ADMIN — KETOROLAC TROMETHAMINE 15 MILLIGRAM(S): 30 INJECTION INTRAMUSCULAR; INTRAVENOUS at 22:35

## 2024-10-28 RX ADMIN — Medication 975 MILLIGRAM(S): at 17:41

## 2024-10-28 RX ADMIN — LINACLOTIDE 290 MICROGRAM(S): 290 CAPSULE, GELATIN COATED ORAL at 22:35

## 2024-10-28 RX ADMIN — HEPARIN SODIUM 5000 UNIT(S): 10000 INJECTION INTRAVENOUS; SUBCUTANEOUS at 17:41

## 2024-10-28 RX ADMIN — Medication 25 MICROGRAM(S): at 12:15

## 2024-10-28 NOTE — CONSULT NOTE ADULT - PROBLEM SELECTOR RECOMMENDATION 9
s/p right partial nephrectomy on 10/28   - Care per primary  team   - Advance diet per  protocol   - Wilder care  - Pain control + bowel regimen   - Encourage OOB and incentive spirometry   - DVT ppx: HSQ  - F/u OR pathology  - F/u post-op AM labs

## 2024-10-28 NOTE — CONSULT NOTE ADULT - ASSESSMENT
59F with hx T2DM, hypothyroidism, IBS, R renal lesion concerning for renal cell carcinoma presenting for right partial nephrectomy.

## 2024-10-28 NOTE — CONSULT NOTE ADULT - PROBLEM SELECTOR RECOMMENDATION 3
Hold home Metformin 500mg bid   - A1c = 5.5   - C/w low dose insulin sliding scale   - Monitor FS qAC and qHS

## 2024-10-28 NOTE — PROGRESS NOTE ADULT - ASSESSMENT
PT is s/p Robot-assisted partial right nephrectomy. No acute events during PACU course. Continue to optimize for discharge.    Strict I&O's  Analgesia/Antiemetics  DVT prophylaxis  Incentive spirometry  AM labs

## 2024-10-29 ENCOUNTER — TRANSCRIPTION ENCOUNTER (OUTPATIENT)
Age: 59
End: 2024-10-29

## 2024-10-29 DIAGNOSIS — D62 ACUTE POSTHEMORRHAGIC ANEMIA: ICD-10-CM

## 2024-10-29 LAB
ANION GAP SERPL CALC-SCNC: 10 MMOL/L — SIGNIFICANT CHANGE UP (ref 7–14)
BUN SERPL-MCNC: 15 MG/DL — SIGNIFICANT CHANGE UP (ref 7–23)
CALCIUM SERPL-MCNC: 8 MG/DL — LOW (ref 8.4–10.5)
CHLORIDE SERPL-SCNC: 103 MMOL/L — SIGNIFICANT CHANGE UP (ref 98–107)
CO2 SERPL-SCNC: 24 MMOL/L — SIGNIFICANT CHANGE UP (ref 22–31)
CREAT FLD-MCNC: 1.16 MG/DL — SIGNIFICANT CHANGE UP
CREAT SERPL-MCNC: 1.21 MG/DL — SIGNIFICANT CHANGE UP (ref 0.5–1.3)
EGFR: 52 ML/MIN/1.73M2 — LOW
GLUCOSE BLDC GLUCOMTR-MCNC: 115 MG/DL — HIGH (ref 70–99)
GLUCOSE BLDC GLUCOMTR-MCNC: 121 MG/DL — HIGH (ref 70–99)
GLUCOSE BLDC GLUCOMTR-MCNC: 129 MG/DL — HIGH (ref 70–99)
GLUCOSE BLDC GLUCOMTR-MCNC: 130 MG/DL — HIGH (ref 70–99)
GLUCOSE SERPL-MCNC: 108 MG/DL — HIGH (ref 70–99)
HCT VFR BLD CALC: 32.3 % — LOW (ref 34.5–45)
HGB BLD-MCNC: 10.2 G/DL — LOW (ref 11.5–15.5)
MCHC RBC-ENTMCNC: 29.6 PG — SIGNIFICANT CHANGE UP (ref 27–34)
MCHC RBC-ENTMCNC: 31.6 GM/DL — LOW (ref 32–36)
MCV RBC AUTO: 93.6 FL — SIGNIFICANT CHANGE UP (ref 80–100)
NRBC # BLD: 0 /100 WBCS — SIGNIFICANT CHANGE UP (ref 0–0)
NRBC # FLD: 0 K/UL — SIGNIFICANT CHANGE UP (ref 0–0)
PLATELET # BLD AUTO: 212 K/UL — SIGNIFICANT CHANGE UP (ref 150–400)
POTASSIUM SERPL-MCNC: 3.8 MMOL/L — SIGNIFICANT CHANGE UP (ref 3.5–5.3)
POTASSIUM SERPL-SCNC: 3.8 MMOL/L — SIGNIFICANT CHANGE UP (ref 3.5–5.3)
RBC # BLD: 3.45 M/UL — LOW (ref 3.8–5.2)
RBC # FLD: 14.1 % — SIGNIFICANT CHANGE UP (ref 10.3–14.5)
SODIUM SERPL-SCNC: 137 MMOL/L — SIGNIFICANT CHANGE UP (ref 135–145)
WBC # BLD: 7.74 K/UL — SIGNIFICANT CHANGE UP (ref 3.8–10.5)
WBC # FLD AUTO: 7.74 K/UL — SIGNIFICANT CHANGE UP (ref 3.8–10.5)

## 2024-10-29 PROCEDURE — 99232 SBSQ HOSP IP/OBS MODERATE 35: CPT

## 2024-10-29 RX ADMIN — Medication 975 MILLIGRAM(S): at 17:12

## 2024-10-29 RX ADMIN — Medication 975 MILLIGRAM(S): at 01:56

## 2024-10-29 RX ADMIN — HEPARIN SODIUM 5000 UNIT(S): 10000 INJECTION INTRAVENOUS; SUBCUTANEOUS at 08:21

## 2024-10-29 RX ADMIN — Medication 10 MILLIGRAM(S): at 06:28

## 2024-10-29 RX ADMIN — HEPARIN SODIUM 5000 UNIT(S): 10000 INJECTION INTRAVENOUS; SUBCUTANEOUS at 01:01

## 2024-10-29 RX ADMIN — HEPARIN SODIUM 5000 UNIT(S): 10000 INJECTION INTRAVENOUS; SUBCUTANEOUS at 16:49

## 2024-10-29 RX ADMIN — Medication 975 MILLIGRAM(S): at 07:28

## 2024-10-29 RX ADMIN — Medication 975 MILLIGRAM(S): at 18:12

## 2024-10-29 RX ADMIN — KETOROLAC TROMETHAMINE 15 MILLIGRAM(S): 30 INJECTION INTRAMUSCULAR; INTRAVENOUS at 18:38

## 2024-10-29 RX ADMIN — Medication 975 MILLIGRAM(S): at 11:20

## 2024-10-29 RX ADMIN — Medication 975 MILLIGRAM(S): at 12:20

## 2024-10-29 RX ADMIN — Medication 975 MILLIGRAM(S): at 00:56

## 2024-10-29 RX ADMIN — LINACLOTIDE 290 MICROGRAM(S): 290 CAPSULE, GELATIN COATED ORAL at 21:43

## 2024-10-29 RX ADMIN — KETOROLAC TROMETHAMINE 15 MILLIGRAM(S): 30 INJECTION INTRAMUSCULAR; INTRAVENOUS at 18:23

## 2024-10-29 RX ADMIN — Medication 975 MILLIGRAM(S): at 06:28

## 2024-10-29 RX ADMIN — Medication 137 MICROGRAM(S): at 06:28

## 2024-10-29 NOTE — PATIENT PROFILE ADULT - FUNCTIONAL ASSESSMENT - BASIC MOBILITY 2.
Looks like he went to ED again today. Was there yesterday for hyperglycemia. He was calling today our office to make f/u appoint and sounded sob to call center and was told to go to ED.         We can have him come in tomorrow when I am supervising if he wants   3 = A little assistance

## 2024-10-29 NOTE — DISCHARGE NOTE NURSING/CASE MANAGEMENT/SOCIAL WORK - COMPLETE THE FOLLOWING IF THE PATIENT REFUSES THE INFLUENZA VACCINE:
Problem: Pain  Goal: Acceptable pain/comfort level is achieved/maintained at rest (based on Pain Behaviors Scale)  This goal is used for patients who are not able to self-report pain and are assessed for pain using the Pain Behaviors Scale  Outcome: Outcome Met, Continue evaluating goal progress toward completion  Pt displaying no pain behaviors, no PRN medication given, repositioned [id number]. Will continue to monitor.    Problem: At Risk for Falls  Goal: # Patient does not fall  Outcome: Outcome Met, Continue evaluating goal progress toward completion  Pt in vegetative state, hourly purposeful rounding completed. Will continue to monitor.    Problem: VTE, Risk for  Goal: # No s/s of VTE  Outcome: Outcome Met, Continue evaluating goal progress toward completion  Pt nonambulatory, SCD's on. Will continue to monitor.    Problem: Pressure Injury, Risk for  Goal: No new pressure injury (PI) development  Outcome: Outcome Met, Continue evaluating goal progress toward completion  Pt repositioned [id number], barrier cream applied, no s/s of new PI noted. Will continue to monitor.       Risks/benefits discussed with patient/surrogate/Vaccine Information Sheet (VIS) provided-VIS date: 8/6/21

## 2024-10-29 NOTE — PATIENT PROFILE ADULT - FALL HARM RISK - HARM RISK INTERVENTIONS

## 2024-10-29 NOTE — DISCHARGE NOTE NURSING/CASE MANAGEMENT/SOCIAL WORK - PATIENT PORTAL LINK FT
You can access the FollowMyHealth Patient Portal offered by [facility] by registering at the following website: [email or web address]. By joining [facility]’s FollowMyHealth portal, you will also be able to view your health information using other applications (apps) compatible with our system.

## 2024-10-29 NOTE — PROGRESS NOTE ADULT - ASSESSMENT
PT is s/p Robot-assisted partial right nephrectomy. No acute events during PACU course. Voiding with persistent hematuria and clots    10/29    Strict I&O's  Analgesia/Antiemetics  DVT prophylaxis  Incentive spirometry  CTM voids  AM labs

## 2024-10-29 NOTE — DISCHARGE NOTE NURSING/CASE MANAGEMENT/SOCIAL WORK - NSDCPNINST_GEN_ALL_CORE
Make a follow up appointment with Dr. Solorio. Call MD if you develop a fever, or if there is redness, swelling, drainage or pain not relieved by pain medication. No heavy lifting, bending, or straining to move your bowels. Take over the counter stool softeners as needed to prevent constipation which may be caused by pain medication. Drink plenty of liquids. Make a follow up appointment with Dr. Solorio. Call MD if you develop a fever, or if there is redness, swelling, drainage or pain not relieved by pain medication. No heavy lifting, bending, or straining to move your bowels. Take over the counter stool softeners as needed to prevent constipation which may be caused by pain medication. Drink plenty of liquids. Your A1C= 5.5. Continue to follow a consistent carbohydrate diet and take your medications for diabetes

## 2024-10-29 NOTE — DISCHARGE NOTE NURSING/CASE MANAGEMENT/SOCIAL WORK - FINANCIAL ASSISTANCE
[facility] provides services at a reduced cost to those who are determined to be eligible through [facility]’s financial assistance program. Information regarding [facility]’s financial assistance program can be found by going to [email or web address] or by calling [phone].

## 2024-10-29 NOTE — DISCHARGE NOTE NURSING/CASE MANAGEMENT/SOCIAL WORK - NSDCPEFALRISK_GEN_ALL_CORE
For information on Fall & Injury Prevention, visit: [email or web address] OR  [email or web address] OR  https://www.cdc.gov/steadi/patient.html

## 2024-10-30 LAB
ANION GAP SERPL CALC-SCNC: 11 MMOL/L — SIGNIFICANT CHANGE UP (ref 7–14)
APTT BLD: 32.6 SEC — SIGNIFICANT CHANGE UP (ref 24.5–35.6)
BLD GP AB SCN SERPL QL: NEGATIVE — SIGNIFICANT CHANGE UP
BUN SERPL-MCNC: 13 MG/DL — SIGNIFICANT CHANGE UP (ref 7–23)
CALCIUM SERPL-MCNC: 8.5 MG/DL — SIGNIFICANT CHANGE UP (ref 8.4–10.5)
CHLORIDE SERPL-SCNC: 103 MMOL/L — SIGNIFICANT CHANGE UP (ref 98–107)
CO2 SERPL-SCNC: 23 MMOL/L — SIGNIFICANT CHANGE UP (ref 22–31)
CREAT SERPL-MCNC: 1.06 MG/DL — SIGNIFICANT CHANGE UP (ref 0.5–1.3)
EGFR: 61 ML/MIN/1.73M2 — SIGNIFICANT CHANGE UP
GLUCOSE BLDC GLUCOMTR-MCNC: 104 MG/DL — HIGH (ref 70–99)
GLUCOSE BLDC GLUCOMTR-MCNC: 113 MG/DL — HIGH (ref 70–99)
GLUCOSE BLDC GLUCOMTR-MCNC: 117 MG/DL — HIGH (ref 70–99)
GLUCOSE BLDC GLUCOMTR-MCNC: 137 MG/DL — HIGH (ref 70–99)
GLUCOSE BLDC GLUCOMTR-MCNC: 94 MG/DL — SIGNIFICANT CHANGE UP (ref 70–99)
GLUCOSE SERPL-MCNC: 141 MG/DL — HIGH (ref 70–99)
HCT VFR BLD CALC: 30.5 % — LOW (ref 34.5–45)
HCT VFR BLD CALC: 32.2 % — LOW (ref 34.5–45)
HGB BLD-MCNC: 10 G/DL — LOW (ref 11.5–15.5)
HGB BLD-MCNC: 10.7 G/DL — LOW (ref 11.5–15.5)
INR BLD: 1.12 RATIO — SIGNIFICANT CHANGE UP (ref 0.85–1.16)
MCHC RBC-ENTMCNC: 30.5 PG — SIGNIFICANT CHANGE UP (ref 27–34)
MCHC RBC-ENTMCNC: 30.7 PG — SIGNIFICANT CHANGE UP (ref 27–34)
MCHC RBC-ENTMCNC: 32.8 G/DL — SIGNIFICANT CHANGE UP (ref 32–36)
MCHC RBC-ENTMCNC: 33.2 G/DL — SIGNIFICANT CHANGE UP (ref 32–36)
MCV RBC AUTO: 92.3 FL — SIGNIFICANT CHANGE UP (ref 80–100)
MCV RBC AUTO: 93 FL — SIGNIFICANT CHANGE UP (ref 80–100)
NRBC # BLD: 0 /100 WBCS — SIGNIFICANT CHANGE UP (ref 0–0)
NRBC # BLD: 0 /100 WBCS — SIGNIFICANT CHANGE UP (ref 0–0)
NRBC # FLD: 0 K/UL — SIGNIFICANT CHANGE UP (ref 0–0)
NRBC # FLD: 0 K/UL — SIGNIFICANT CHANGE UP (ref 0–0)
PLATELET # BLD AUTO: 192 K/UL — SIGNIFICANT CHANGE UP (ref 150–400)
PLATELET # BLD AUTO: 199 K/UL — SIGNIFICANT CHANGE UP (ref 150–400)
POTASSIUM SERPL-MCNC: 3.8 MMOL/L — SIGNIFICANT CHANGE UP (ref 3.5–5.3)
POTASSIUM SERPL-SCNC: 3.8 MMOL/L — SIGNIFICANT CHANGE UP (ref 3.5–5.3)
PROTHROM AB SERPL-ACNC: 13.3 SEC — SIGNIFICANT CHANGE UP (ref 9.9–13.4)
RBC # BLD: 3.28 M/UL — LOW (ref 3.8–5.2)
RBC # BLD: 3.49 M/UL — LOW (ref 3.8–5.2)
RBC # FLD: 13.7 % — SIGNIFICANT CHANGE UP (ref 10.3–14.5)
RBC # FLD: 13.8 % — SIGNIFICANT CHANGE UP (ref 10.3–14.5)
RH IG SCN BLD-IMP: POSITIVE — SIGNIFICANT CHANGE UP
SODIUM SERPL-SCNC: 137 MMOL/L — SIGNIFICANT CHANGE UP (ref 135–145)
WBC # BLD: 8.2 K/UL — SIGNIFICANT CHANGE UP (ref 3.8–10.5)
WBC # BLD: 9.35 K/UL — SIGNIFICANT CHANGE UP (ref 3.8–10.5)
WBC # FLD AUTO: 8.2 K/UL — SIGNIFICANT CHANGE UP (ref 3.8–10.5)
WBC # FLD AUTO: 9.35 K/UL — SIGNIFICANT CHANGE UP (ref 3.8–10.5)

## 2024-10-30 PROCEDURE — 99232 SBSQ HOSP IP/OBS MODERATE 35: CPT

## 2024-10-30 PROCEDURE — 76937 US GUIDE VASCULAR ACCESS: CPT | Mod: 26

## 2024-10-30 PROCEDURE — 36253 INS CATH REN ART 2ND+ UNILAT: CPT | Mod: LT,59

## 2024-10-30 PROCEDURE — 37242 VASC EMBOLIZE/OCCLUDE ARTERY: CPT

## 2024-10-30 RX ORDER — INSULIN LISPRO 100/ML
VIAL (ML) SUBCUTANEOUS EVERY 6 HOURS
Refills: 0 | Status: DISCONTINUED | OUTPATIENT
Start: 2024-10-30 | End: 2024-10-31

## 2024-10-30 RX ORDER — ONDANSETRON HYDROCHLORIDE 2 MG/ML
4 INJECTION, SOLUTION INTRAMUSCULAR; INTRAVENOUS ONCE
Refills: 0 | Status: DISCONTINUED | OUTPATIENT
Start: 2024-10-30 | End: 2024-10-31

## 2024-10-30 RX ORDER — INSULIN LISPRO 100/ML
VIAL (ML) SUBCUTANEOUS EVERY 6 HOURS
Refills: 0 | Status: DISCONTINUED | OUTPATIENT
Start: 2024-10-30 | End: 2024-10-30

## 2024-10-30 RX ORDER — OXYCODONE HYDROCHLORIDE 30 MG/1
5 TABLET ORAL ONCE
Refills: 0 | Status: DISCONTINUED | OUTPATIENT
Start: 2024-10-30 | End: 2024-10-31

## 2024-10-30 RX ORDER — INSULIN LISPRO 100/ML
VIAL (ML) SUBCUTANEOUS AT BEDTIME
Refills: 0 | Status: DISCONTINUED | OUTPATIENT
Start: 2024-10-30 | End: 2024-10-30

## 2024-10-30 RX ORDER — HYDROMORPHONE HCL/0.9% NACL/PF 6 MG/30 ML
0.5 PATIENT CONTROLLED ANALGESIA SYRINGE INTRAVENOUS
Refills: 0 | Status: DISCONTINUED | OUTPATIENT
Start: 2024-10-30 | End: 2024-10-31

## 2024-10-30 RX ORDER — DIAZEPAM 10 MG/1
5 FILM BUCCAL ONCE
Refills: 0 | Status: DISCONTINUED | OUTPATIENT
Start: 2024-10-30 | End: 2024-10-30

## 2024-10-30 RX ADMIN — LINACLOTIDE 290 MICROGRAM(S): 290 CAPSULE, GELATIN COATED ORAL at 22:23

## 2024-10-30 RX ADMIN — Medication 975 MILLIGRAM(S): at 05:34

## 2024-10-30 RX ADMIN — KETOROLAC TROMETHAMINE 15 MILLIGRAM(S): 30 INJECTION INTRAMUSCULAR; INTRAVENOUS at 07:26

## 2024-10-30 RX ADMIN — Medication 137 MICROGRAM(S): at 05:34

## 2024-10-30 RX ADMIN — Medication 975 MILLIGRAM(S): at 06:21

## 2024-10-30 RX ADMIN — DIAZEPAM 5 MILLIGRAM(S): 10 FILM BUCCAL at 22:21

## 2024-10-30 NOTE — PROCEDURE NOTE - PROCEDURE FINDINGS AND DETAILS
Right common femoral artery access.   Right renal angiography was performed in multiple projections with an area of abnormal appearing vessels in the region of the right lower pole. Multiple branches supplying the lower pole were studied with one branch demonstrating abnormal vascularity with irregular vessels. Coil embolization was performed demonstrating no flow past the coil pack on post embolization angiography.   The Mynx closure device was used for groin hemostasis. Dry sterile dressing was applied. - Right renal angiography was performed in multiple projections with an area of abnormal appearing vessels in the region of the right lower pole. Multiple branches supplying the lower pole were studied with one branch demonstrating abnormal vascularity with irregular vessels. Coil embolization was performed demonstrating no flow past the coil pack on post embolization angiography. The Mynx closure device was used for groin hemostasis. Dry sterile dressing was applied.  - Official report to follow.

## 2024-10-30 NOTE — PRE PROCEDURE NOTE - PRE PROCEDURE EVALUATION
------------------------------------------------------------  Interventional Radiology Pre-Procedure Note  ------------------------------------------------------------    Indication: 59y Female with pmh of right renal lesion s/p right partial nephrectomy 10/28 with persistent hematuria post-op. Patient has continued to have hematuria with initial hgb drop of 13.5 (pre-op) to now 10. Plan for renal angiogram and possible embolization today.     Past Medical History:  Hypothyroidism    DM (diabetes mellitus)    IBS (irritable bowel syndrome)        Allergies: niacin (Urticaria; Rash)  morphine (Unknown)  statins (Rash; Urticaria)  Levaquin (Unknown)      Medications:  heparin   Injectable: 5000 Unit(s) SubCutaneous (10-29-24 @ 16:49)      Vital Signs:   T(F): 97.7 (17:46), Max: 98.7 (14:06)  HR: 96 (17:46)  BP: 145/69 (17:46)  RR: 17 (17:46)  SpO2: 95% (17:46)    Labs:           10.0  8.20)-----(192     (10-30-24 @ 15:00)         30.5     137 | 103 | 13  --------------------< 141     (10-30-24 @ 06:06)  3.8 | 23 | 1.06       PT: 13.3 10-30-24 @ 07:48  aPTT: 32.6 10-30-24 @ 07:48   INR: 1.12 10-30-24 @ 07:48    Imaging: MRI abdomen 8/16/24 was reviewed     Consent: Risks/benefits/alternatives were explained and informed written consent was obtained.     Procedure Plan: Plan for right renal angiogram and possible embolization today.      ------------------------------------------------------------  Interventional Radiology Pre-Procedure Note  ------------------------------------------------------------    Indication: 59y Female with pmh of right renal lesion s/p right partial nephrectomy 10/28 with persistent hematuria post-op. Patient has continued to have hematuria with initial hgb drop of 13.5 (pre-op) to now 10. Plan for renal angiogram and possible embolization today.     Past Medical History:  Hypothyroidism    DM (diabetes mellitus)    IBS (irritable bowel syndrome)        Allergies: niacin (Urticaria; Rash)  morphine (Unknown)  statins (Rash; Urticaria)  Levaquin (Unknown)      Medications:  heparin   Injectable: 5000 Unit(s) SubCutaneous (10-29-24 @ 16:49)      Vital Signs:   T(F): 97.7 (17:46), Max: 98.7 (14:06)  HR: 96 (17:46)  BP: 145/69 (17:46)  RR: 17 (17:46)  SpO2: 95% (17:46)    Labs:           10.0  8.20)-----(192     (10-30-24 @ 15:00)         30.5     137 | 103 | 13  --------------------< 141     (10-30-24 @ 06:06)  3.8 | 23 | 1.06       PT: 13.3 10-30-24 @ 07:48  aPTT: 32.6 10-30-24 @ 07:48   INR: 1.12 10-30-24 @ 07:48    Imaging: MRI abdomen 8/16/24 was reviewed     Consent: Risks/benefits/alternatives were discussed with patient including but not limited to risks of infection, bleeding, vessel injury and coil malposition/migration that may require blood transfusion and/or additional procedures. Patient verbalizes understanding and witnessed informed consent was obtained.     Procedure Plan: Plan for right renal angiogram and possible embolization today.

## 2024-10-30 NOTE — PROGRESS NOTE ADULT - ASSESSMENT
59yof s/p R laparoscopic partial nephrectomy    - NPO for possible IR today  - Pain control  - Wilder out, passed ToV  - H+H stable

## 2024-10-30 NOTE — PROGRESS NOTE ADULT - ASSESSMENT
59F with hx T2DM, hypothyroidism, IBS, R renal lesion concerning for renal cell carcinoma presenting for right partial nephrectomy. Post-op course c/b hematuria w/ clots, pending IR renal angiogram and embolization
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
## 2024-10-30 NOTE — PROCEDURE NOTE - PLAN
- Recovery with RLE extended x4 hours.   - Continue to monitor hematuria.  - Global care per primary team.
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
## 2024-10-30 NOTE — PROCEDURE NOTE - GENERAL PROCEDURE NAME
right renal angiogram and coil embolization of a lower pole branch Right renal angiogram and coil embolization of a lower pole arterial branch

## 2024-10-31 ENCOUNTER — TRANSCRIPTION ENCOUNTER (OUTPATIENT)
Age: 59
End: 2024-10-31

## 2024-10-31 VITALS
RESPIRATION RATE: 18 BRPM | SYSTOLIC BLOOD PRESSURE: 132 MMHG | TEMPERATURE: 98 F | OXYGEN SATURATION: 99 % | HEART RATE: 82 BPM | DIASTOLIC BLOOD PRESSURE: 78 MMHG

## 2024-10-31 LAB
ANION GAP SERPL CALC-SCNC: 12 MMOL/L — SIGNIFICANT CHANGE UP (ref 7–14)
BUN SERPL-MCNC: 10 MG/DL — SIGNIFICANT CHANGE UP (ref 7–23)
CALCIUM SERPL-MCNC: 8.3 MG/DL — LOW (ref 8.4–10.5)
CHLORIDE SERPL-SCNC: 105 MMOL/L — SIGNIFICANT CHANGE UP (ref 98–107)
CO2 SERPL-SCNC: 22 MMOL/L — SIGNIFICANT CHANGE UP (ref 22–31)
CREAT SERPL-MCNC: 0.93 MG/DL — SIGNIFICANT CHANGE UP (ref 0.5–1.3)
EGFR: 71 ML/MIN/1.73M2 — SIGNIFICANT CHANGE UP
GLUCOSE BLDC GLUCOMTR-MCNC: 100 MG/DL — HIGH (ref 70–99)
GLUCOSE BLDC GLUCOMTR-MCNC: 123 MG/DL — HIGH (ref 70–99)
GLUCOSE BLDC GLUCOMTR-MCNC: 128 MG/DL — HIGH (ref 70–99)
GLUCOSE SERPL-MCNC: 116 MG/DL — HIGH (ref 70–99)
HCT VFR BLD CALC: 29.2 % — LOW (ref 34.5–45)
HGB BLD-MCNC: 9.4 G/DL — LOW (ref 11.5–15.5)
MCHC RBC-ENTMCNC: 30.1 PG — SIGNIFICANT CHANGE UP (ref 27–34)
MCHC RBC-ENTMCNC: 32.2 G/DL — SIGNIFICANT CHANGE UP (ref 32–36)
MCV RBC AUTO: 93.6 FL — SIGNIFICANT CHANGE UP (ref 80–100)
NRBC # BLD: 0 /100 WBCS — SIGNIFICANT CHANGE UP (ref 0–0)
NRBC # FLD: 0 K/UL — SIGNIFICANT CHANGE UP (ref 0–0)
PLATELET # BLD AUTO: 201 K/UL — SIGNIFICANT CHANGE UP (ref 150–400)
POTASSIUM SERPL-MCNC: 3.6 MMOL/L — SIGNIFICANT CHANGE UP (ref 3.5–5.3)
POTASSIUM SERPL-SCNC: 3.6 MMOL/L — SIGNIFICANT CHANGE UP (ref 3.5–5.3)
RBC # BLD: 3.12 M/UL — LOW (ref 3.8–5.2)
RBC # FLD: 13.9 % — SIGNIFICANT CHANGE UP (ref 10.3–14.5)
SODIUM SERPL-SCNC: 139 MMOL/L — SIGNIFICANT CHANGE UP (ref 135–145)
WBC # BLD: 6.91 K/UL — SIGNIFICANT CHANGE UP (ref 3.8–10.5)
WBC # FLD AUTO: 6.91 K/UL — SIGNIFICANT CHANGE UP (ref 3.8–10.5)

## 2024-10-31 PROCEDURE — 99232 SBSQ HOSP IP/OBS MODERATE 35: CPT

## 2024-10-31 RX ORDER — IBUPROFEN 200 MG
2 TABLET ORAL
Qty: 0 | Refills: 0 | DISCHARGE

## 2024-10-31 RX ORDER — SENNA 187 MG
2 TABLET ORAL
Qty: 0 | Refills: 0 | DISCHARGE
Start: 2024-10-31

## 2024-10-31 RX ORDER — KETOROLAC TROMETHAMINE 30 MG/ML
15 INJECTION INTRAMUSCULAR; INTRAVENOUS EVERY 8 HOURS
Refills: 0 | Status: DISCONTINUED | OUTPATIENT
Start: 2024-10-31 | End: 2024-10-31

## 2024-10-31 RX ORDER — ACETAMINOPHEN 500 MG
3 TABLET ORAL
Qty: 0 | Refills: 0 | DISCHARGE
Start: 2024-10-31

## 2024-10-31 RX ORDER — INSULIN LISPRO 100/ML
VIAL (ML) SUBCUTANEOUS
Refills: 0 | Status: DISCONTINUED | OUTPATIENT
Start: 2024-10-31 | End: 2024-10-31

## 2024-10-31 RX ORDER — HEPARIN SODIUM 10000 [USP'U]/ML
5000 INJECTION INTRAVENOUS; SUBCUTANEOUS EVERY 8 HOURS
Refills: 0 | Status: DISCONTINUED | OUTPATIENT
Start: 2024-10-31 | End: 2024-10-31

## 2024-10-31 RX ADMIN — Medication 975 MILLIGRAM(S): at 12:50

## 2024-10-31 RX ADMIN — Medication 975 MILLIGRAM(S): at 05:55

## 2024-10-31 RX ADMIN — Medication 137 MICROGRAM(S): at 05:55

## 2024-10-31 RX ADMIN — KETOROLAC TROMETHAMINE 15 MILLIGRAM(S): 30 INJECTION INTRAMUSCULAR; INTRAVENOUS at 03:09

## 2024-10-31 RX ADMIN — Medication 975 MILLIGRAM(S): at 06:53

## 2024-10-31 RX ADMIN — Medication 975 MILLIGRAM(S): at 11:55

## 2024-10-31 RX ADMIN — KETOROLAC TROMETHAMINE 15 MILLIGRAM(S): 30 INJECTION INTRAMUSCULAR; INTRAVENOUS at 02:50

## 2024-10-31 RX ADMIN — Medication 975 MILLIGRAM(S): at 00:57

## 2024-10-31 NOTE — PROGRESS NOTE ADULT - SUBJECTIVE AND OBJECTIVE BOX
Interval Events:    Continuing to have bloody urine with clots.  H+H stable    S: Patient doing well, denies fevers, chills, nausea, emesis, chest pain, SOB.  Pain is well controlled. Tolerating PO w/o N/V.  +/+ F/BM.    O: Vital Signs Last 24 Hrs  T(C): 36.8 (30 Oct 2024 09:15), Max: 36.9 (29 Oct 2024 13:44)  T(F): 98.2 (30 Oct 2024 09:15), Max: 98.5 (29 Oct 2024 18:03)  HR: 99 (30 Oct 2024 09:15) (81 - 100)  BP: 134/58 (30 Oct 2024 09:15) (116/54 - 153/64)  BP(mean): --  RR: 18 (30 Oct 2024 09:15) (16 - 18)  SpO2: 98% (30 Oct 2024 09:15) (96% - 99%)    Parameters below as of 30 Oct 2024 09:15  Patient On (Oxygen Delivery Method): room air          29 Oct 2024 07:01  -  30 Oct 2024 07:00  --------------------------------------------------------  IN:  Total IN: 0 mL    OUT:    Drain (mL): 19.5 mL    Voided (mL): 3600 mL  Total OUT: 3619.5 mL    Total NET: -3619.5 mL      30 Oct 2024 07:01  -  30 Oct 2024 09:45  --------------------------------------------------------  IN:  Total IN: 0 mL    OUT:    Voided (mL): 300 mL  Total OUT: 300 mL    Total NET: -300 mL          Physical Exam:    Gen: Well-developed, well-nourished in no acute distress  Resp: No additional work of breathing   GI: Port sites well approximated with steri strips  KAVON drain with seronsanguineous output  MSK: Moves all extremities equally  Skin: No rashes    Labs:                        10.7   9.35  )-----------( 199      ( 30 Oct 2024 06:06 )             32.2     30 Oct 2024 06:06    137    |  103    |  13     ----------------------------<  141    3.8     |  23     |  1.06     Ca    8.5        30 Oct 2024 06:06      PT/INR - ( 30 Oct 2024 07:48 )   PT: 13.3 sec;   INR: 1.12 ratio         PTT - ( 30 Oct 2024 07:48 )  PTT:32.6 sec  CAPILLARY BLOOD GLUCOSE      POCT Blood Glucose.: 137 mg/dL (30 Oct 2024 06:06)  POCT Blood Glucose.: 115 mg/dL (29 Oct 2024 21:07)  POCT Blood Glucose.: 130 mg/dL (29 Oct 2024 16:43)  POCT Blood Glucose.: 121 mg/dL (29 Oct 2024 11:14)            Urinalysis Basic - ( 30 Oct 2024 06:06 )    Color: x / Appearance: x / SG: x / pH: x  Gluc: 141 mg/dL / Ketone: x  / Bili: x / Urobili: x   Blood: x / Protein: x / Nitrite: x   Leuk Esterase: x / RBC: x / WBC x   Sq Epi: x / Non Sq Epi: x / Bacteria: x        MEDICATIONS  (STANDING):  acetaminophen     Tablet .. 975 milliGRAM(s) Oral every 6 hours  dextrose 5% + sodium chloride 0.45%. 1000 milliLiter(s) (75 mL/Hr) IV Continuous <Continuous>  dextrose 5%. 1000 milliLiter(s) (50 mL/Hr) IV Continuous <Continuous>  dextrose 5%. 1000 milliLiter(s) (100 mL/Hr) IV Continuous <Continuous>  dextrose 50% Injectable 12.5 Gram(s) IV Push once  dextrose 50% Injectable 25 Gram(s) IV Push once  dextrose 50% Injectable 25 Gram(s) IV Push once  famotidine Injectable 20 milliGRAM(s) IV Push once  glucagon  Injectable 1 milliGRAM(s) IntraMuscular once  insulin lispro (ADMELOG) corrective regimen sliding scale   SubCutaneous every 6 hours  levothyroxine 137 MICROGram(s) Oral daily  linaclotide 290 MICROGram(s) Oral <User Schedule>    MEDICATIONS  (PRN):  dextrose Oral Gel 15 Gram(s) Oral once PRN Blood Glucose LESS THAN 70 milliGRAM(s)/deciliter  ondansetron    Tablet 4 milliGRAM(s) Oral every 8 hours PRN Nausea and/or Vomiting  senna 2 Tablet(s) Oral at bedtime PRN Constipation  
  59y Female s/p right renal angiogram and coil embolization of a lower pole arterial branch on 10/30 in Interventional Radiology.     Patient seen and examined at bedside, resting comfortably. No complaints offered.  Hematuria now resolved, urine is clear, yellow.     T(F): 97.8 (10-31-24 @ 09:27), Max: 98.8 (10-31-24 @ 01:00)  HR: 82 (10-31-24 @ 09:27) (77 - 110)  BP: 132/78 (10-31-24 @ 09:27) (127/68 - 157/76)  RR: 18 (10-31-24 @ 09:27) (14 - 25)  SpO2: 99% (10-31-24 @ 09:27) (93% - 100%)    LABS:                        9.4    6.91  )-----------( 201      ( 31 Oct 2024 05:50 )             29.2     10-31    139  |  105  |  10  ----------------------------<  116[H]  3.6   |  22  |  0.93    Ca    8.3[L]      31 Oct 2024 05:50      PT/INR - ( 30 Oct 2024 07:48 )   PT: 13.3 sec;   INR: 1.12 ratio         PTT - ( 30 Oct 2024 07:48 )  PTT:32.6 sec  I&O's Detail    30 Oct 2024 07:01  -  31 Oct 2024 07:00  --------------------------------------------------------  IN:    dextrose 5% + sodium chloride 0.45%: 300 mL    Oral Fluid: 290 mL  Total IN: 590 mL    OUT:    Drain (mL): 10 mL    Voided (mL): 2525 mL  Total OUT: 2535 mL    Total NET: -1945 mL      PHYSICAL EXAM:  General: Nontoxic, resting comfortably in bed, NAD  Right femoral site: Dressing C/D/I. No pain upon palpation. No hematoma, swelling or active bleeding noted. DP pulse palpated, 2+.   : urine is clear yellow.   
 Note    Post op Check    s/p : Robot-assisted partial right nephrectomy    Pt seen / examined without complaints pain controlled    Vital Signs Last 24 Hrs  T(C): 35.7 (28 Oct 2024 13:30), Max: 36.4 (28 Oct 2024 06:09)  T(F): 96.3 (28 Oct 2024 13:30), Max: 97.5 (28 Oct 2024 06:09)  HR: 58 (28 Oct 2024 13:45) (57 - 77)  BP: 101/64 (28 Oct 2024 13:45) (101/64 - 133/75)  BP(mean): 75 (28 Oct 2024 13:45) (72 - 93)  RR: 13 (28 Oct 2024 13:45) (11 - 20)  SpO2: 95% (28 Oct 2024 13:45) (94% - 100%)    Parameters below as of 28 Oct 2024 12:45  Patient On (Oxygen Delivery Method): room air    I&O's Summary    28 Oct 2024 07:01  -  28 Oct 2024 14:08  --------------------------------------------------------  IN: 375 mL / OUT: 370 mL / NET: 5 mL    Vital signs reviewed.   CONSTITUTIONAL: Well-appearing; well-nourished; in no apparent distress.   HEAD: Normocephalic, atraumatic.  EYES: Normal conjunctiva and no sclera injection noted  ENT: Normal nose; no rhinorrhea  CARD: Normal S1, S2  RESP: Normal chest excursion with respiration; breath sounds clear and equal bilaterally  ABD/GI: Surgical sites CDI. Soft, non-distended; non-tender. KAVON drain intact  EXT/MS: moves all extremities; distal pulses are normal, no pedal edema.  SKIN: Normal for age and race  NEURO: Awake, alert, oriented x 3  PSYCH: Normal mood; appropriate affect.    Wilder 300 ccc light punch color  KAVON drain clamped    
Interval Events:    S/p coil embolization with IR last night with Dr. islas    Urine color improved    S: Patient doing well, denies fevers, chills, nausea, emesis, chest pain, SOB.  Pain is well controlled. Tolerating PO w/o N/V.  +/+ F/BM.    O: Vital Signs Last 24 Hrs  T(C): 36.4 (31 Oct 2024 06:16), Max: 37.1 (30 Oct 2024 14:06)  T(F): 97.5 (31 Oct 2024 06:16), Max: 98.8 (31 Oct 2024 01:00)  HR: 77 (31 Oct 2024 06:16) (77 - 110)  BP: 138/63 (31 Oct 2024 06:16) (127/68 - 157/76)  BP(mean): 101 (31 Oct 2024 01:00) (84 - 101)  RR: 18 (31 Oct 2024 06:16) (14 - 25)  SpO2: 97% (31 Oct 2024 06:16) (93% - 100%)    Parameters below as of 31 Oct 2024 06:16  Patient On (Oxygen Delivery Method): room air          30 Oct 2024 07:01  -  31 Oct 2024 07:00  --------------------------------------------------------  IN:    dextrose 5% + sodium chloride 0.45%: 300 mL    Oral Fluid: 290 mL  Total IN: 590 mL    OUT:    Drain (mL): 10 mL    Voided (mL): 2525 mL  Total OUT: 2535 mL    Total NET: -1945 mL          Physical Exam:    Gen: Well-developed, well-nourished in no acute distress  Resp: No additional work of breathing   GI: Soft, non-tender, non-distended, with normoactive bowel sounds.  No masses.  Incisions well approximated with steri strips overlying  MSK: Moves all extremities equally  Skin: No rashes    Labs:                        9.4    6.91  )-----------( 201      ( 31 Oct 2024 05:50 )             29.2     31 Oct 2024 05:50    139    |  105    |  10     ----------------------------<  116    3.6     |  22     |  0.93     Ca    8.3        31 Oct 2024 05:50      PT/INR - ( 30 Oct 2024 07:48 )   PT: 13.3 sec;   INR: 1.12 ratio         PTT - ( 30 Oct 2024 07:48 )  PTT:32.6 sec  CAPILLARY BLOOD GLUCOSE      POCT Blood Glucose.: 128 mg/dL (31 Oct 2024 07:12)  POCT Blood Glucose.: 100 mg/dL (31 Oct 2024 01:27)  POCT Blood Glucose.: 113 mg/dL (30 Oct 2024 23:54)  POCT Blood Glucose.: 94 mg/dL (30 Oct 2024 21:28)  POCT Blood Glucose.: 104 mg/dL (30 Oct 2024 17:53)  POCT Blood Glucose.: 117 mg/dL (30 Oct 2024 12:01)            Urinalysis Basic - ( 31 Oct 2024 05:50 )    Color: x / Appearance: x / SG: x / pH: x  Gluc: 116 mg/dL / Ketone: x  / Bili: x / Urobili: x   Blood: x / Protein: x / Nitrite: x   Leuk Esterase: x / RBC: x / WBC x   Sq Epi: x / Non Sq Epi: x / Bacteria: x        MEDICATIONS  (STANDING):  acetaminophen     Tablet .. 975 milliGRAM(s) Oral every 6 hours  dextrose 5%. 1000 milliLiter(s) (50 mL/Hr) IV Continuous <Continuous>  dextrose 5%. 1000 milliLiter(s) (100 mL/Hr) IV Continuous <Continuous>  dextrose 50% Injectable 12.5 Gram(s) IV Push once  dextrose 50% Injectable 25 Gram(s) IV Push once  dextrose 50% Injectable 25 Gram(s) IV Push once  famotidine Injectable 20 milliGRAM(s) IV Push once  glucagon  Injectable 1 milliGRAM(s) IntraMuscular once  heparin   Injectable 5000 Unit(s) SubCutaneous every 8 hours  insulin lispro (ADMELOG) corrective regimen sliding scale   SubCutaneous three times a day with meals  levothyroxine 137 MICROGram(s) Oral daily  linaclotide 290 MICROGram(s) Oral <User Schedule>    MEDICATIONS  (PRN):  dextrose Oral Gel 15 Gram(s) Oral once PRN Blood Glucose LESS THAN 70 milliGRAM(s)/deciliter  ketorolac   Injectable 15 milliGRAM(s) IV Push every 8 hours PRN Moderate Pain (4 - 6)  ondansetron    Tablet 4 milliGRAM(s) Oral every 8 hours PRN Nausea and/or Vomiting  senna 2 Tablet(s) Oral at bedtime PRN Constipation  
Subjective  Denies fevers, chills, nausea, vomiting, SOB, CP.  Tolerating diet.    Objective    Vital signs  T(F): , Max: 98.5 (10-29-24 @ 18:03)  HR: 92 (10-29-24 @ 18:03)  BP: 130/68 (10-29-24 @ 18:03)  SpO2: 96% (10-29-24 @ 18:03)  Wt(kg): --    Output     OUT:    Drain (mL): 17.5 mL    Voided (mL): 1300 mL            Gen: NAD  Abd: soft, appropriately tender, R KAVON draining serosanguinous  : voids between cherry red and punch red with clot    Labs    Complete Blood Count in AM (10.29.24 @ 05:25)   Nucleated RBC: 0 /100 WBCs  WBC Count: 7.74 K/uL  RBC Count: 3.45 M/uL  Hemoglobin: 10.2 g/dL  Hematocrit: 32.3 %  Mean Cell Volume: 93.6 fL  Mean Cell Hemoglobin: 29.6 pg  Mean Cell Hemoglobin Conc: 31.6 gm/dL  Red Cell Distrib Width: 14.1 %  Platelet Count - Automated: 212 K/uL  Nucleated RBC #: 0.00 K/uL    Basic Metabolic Panel in AM (10.29.24 @ 05:25)   Sodium: 137 mmol/L  Potassium: 3.8 mmol/L  Chloride: 103 mmol/L  Carbon Dioxide: 24 mmol/L  Anion Gap: 10 mmol/L  Blood Urea Nitrogen: 15 mg/dL  Creatinine: 1.21 mg/dL  Glucose: 108 mg/dL  Calcium: 8.0 mg/dL  eGFR: 52: The estimated glomerular filtration rate (eGFR) calculation is based on   the 2021 CKD-EPI creatinine equation, which is validated in male and   female population 18 years of age and older (N Engl J Med 2021;   [id number]:[id number]). mL/min/1.73m2      
 Note    Post op Check    s/p : Right renal angiogram and coil embolization of a lower pole arterial branch    Pt seen / examined without complaints pain controlled    Vital Signs Last 24 Hrs  T(C): 36.8 (30 Oct 2024 21:25), Max: 37.1 (30 Oct 2024 14:06)  T(F): 98.2 (30 Oct 2024 21:25), Max: 98.7 (30 Oct 2024 14:06)  HR: 91 (30 Oct 2024 22:30) (87 - 110)  BP: 150/73 (30 Oct 2024 22:30) (127/68 - 153/64)  BP(mean): 93 (30 Oct 2024 22:30) (84 - 93)  RR: 20 (30 Oct 2024 22:30) (16 - 25)  SpO2: 96% (30 Oct 2024 22:30) (93% - 99%)    Parameters below as of 30 Oct 2024 22:00  Patient On (Oxygen Delivery Method): room air        I&O's Summary    29 Oct 2024 07:01  -  30 Oct 2024 07:00  --------------------------------------------------------  IN: 0 mL / OUT: 3619.5 mL / NET: -3619.5 mL    30 Oct 2024 07:01  -  30 Oct 2024 22:40  --------------------------------------------------------  IN: 0 mL / OUT: 1330 mL / NET: -1330 mL    Void-pend    PHYSICAL EXAM:       Constitutional: awake alert NAD    Respiratory: no resp distress    Cardiovascular: RRR    Gastrointestinal: soft appropriately tender, non distended    Genitourinary: primafit in place - no void yet    Extremities: + venodynes                                        10.0   8.20  )-----------( 192      ( 30 Oct 2024 15:00 )             30.5       10-30    137  |  103  |  13  ----------------------------<  141[H]  3.8   |  23  |  1.06    Ca    8.5      30 Oct 2024 06:06                  
CHIEF COMPLAINT: f/u     SUBJECTIVE / OVERNIGHT EVENTS: Patient seen and examined.  at bedside. Reports nausea and vomiting last night due to pain medication, improved today. Reports feeling sore and tired today, but has been able to get up and walk down the [place]. Denies chest pain or shortness of breath. Wilder removed this AM, voided dark red urine initially, second void was lighter. Has not passed flatus yet. Tolerating clears but doesn't have an appetite.     MEDICATIONS  (STANDING):  acetaminophen     Tablet .. 975 milliGRAM(s) Oral every 6 hours  dextrose 5% + sodium chloride 0.45%. 1000 milliLiter(s) (75 mL/Hr) IV Continuous <Continuous>  dextrose 5%. 1000 milliLiter(s) (100 mL/Hr) IV Continuous <Continuous>  dextrose 5%. 1000 milliLiter(s) (50 mL/Hr) IV Continuous <Continuous>  dextrose 50% Injectable 25 Gram(s) IV Push once  dextrose 50% Injectable 25 Gram(s) IV Push once  dextrose 50% Injectable 12.5 Gram(s) IV Push once  famotidine Injectable 20 milliGRAM(s) IV Push once  glucagon  Injectable 1 milliGRAM(s) IntraMuscular once  heparin   Injectable 5000 Unit(s) SubCutaneous every 8 hours  insulin lispro (ADMELOG) corrective regimen sliding scale   SubCutaneous three times a day before meals  insulin lispro (ADMELOG) corrective regimen sliding scale   SubCutaneous at bedtime  levothyroxine 137 MICROGram(s) Oral daily  linaclotide 290 MICROGram(s) Oral <User Schedule>    MEDICATIONS  (PRN):  dextrose Oral Gel 15 Gram(s) Oral once PRN Blood Glucose LESS THAN 70 milliGRAM(s)/deciliter  ketorolac   Injectable 15 milliGRAM(s) IV Push every 8 hours PRN Moderate Pain (4 - 6)  ondansetron    Tablet 4 milliGRAM(s) Oral every 8 hours PRN Nausea and/or Vomiting  senna 2 Tablet(s) Oral at bedtime PRN Constipation      VITALS:  T(F): 98.3 (10-29-24 @ 09:12), Max: 98.7 (10-28-24 @ 16:52)  HR: 76 (10-29-24 @ 09:12) (57 - 83)  BP: 132/55 (10-29-24 @ 09:12) (101/64 - 134/71)  RR: 18 (10-29-24 @ 09:12) (11 - 18)  SpO2: 100% (10-29-24 @ 09:12)  Wt(kg): --      GENERAL: NAD, well-groomed, well-developed  EYES: Conjunctiva and sclera clear  ENMT: Moist mucous membranes  RESPIRATORY: Clear to auscultation bilaterally; No rales, rhonchi, wheezing, or rubs  CARDIOVASCULAR: Regular rate and rhythm; No murmurs, rubs, or gallops  GASTROINTESTINAL: Lap sites c/d/i  GENITOURINARY: Wilder in place w/ red punch colored urine   EXTREMITIES:  2+ Peripheral Pulses, No clubbing, cyanosis, or edema  NERVOUS SYSTEM:  Alert & Oriented X3; Moving all 4 extremities    LABS:              10.2                 137  | 24   | 15           7.74  >-----------< 212     ------------------------< 108                   32.3                 3.8  | 103  | 1.21                                         Ca 8.0   Mg x     Ph x                  Urinalysis Basic - ( 29 Oct 2024 05:25 )    Color: x / Appearance: x / SG: x / pH: x  Gluc: 108 mg/dL / Ketone: x  / Bili: x / Urobili: x   Blood: x / Protein: x / Nitrite: x   Leuk Esterase: x / RBC: x / WBC x   Sq Epi: x / Non Sq Epi: x / Bacteria: x      [x] Care Discussed with Consultants/Other Providers: Urology PA - discussed  
CHIEF COMPLAINT: f/u     SUBJECTIVE / OVERNIGHT EVENTS: Patient seen and examined,  at bedside. Reports she is still having bloody urine with intermittent clots today. Has a "pulling" sensation when she urinates, but no pain. Denies lightheadedness, dizziness. Denies chest pain or shortness of breath. Denies nausea but has no appetite.     MEDICATIONS  (STANDING):  acetaminophen     Tablet .. 975 milliGRAM(s) Oral every 6 hours  dextrose 5% + sodium chloride 0.45%. 1000 milliLiter(s) (75 mL/Hr) IV Continuous <Continuous>  dextrose 5%. 1000 milliLiter(s) (50 mL/Hr) IV Continuous <Continuous>  dextrose 5%. 1000 milliLiter(s) (100 mL/Hr) IV Continuous <Continuous>  dextrose 50% Injectable 25 Gram(s) IV Push once  dextrose 50% Injectable 25 Gram(s) IV Push once  dextrose 50% Injectable 12.5 Gram(s) IV Push once  famotidine Injectable 20 milliGRAM(s) IV Push once  glucagon  Injectable 1 milliGRAM(s) IntraMuscular once  insulin lispro (ADMELOG) corrective regimen sliding scale   SubCutaneous every 6 hours  levothyroxine 137 MICROGram(s) Oral daily  linaclotide 290 MICROGram(s) Oral <User Schedule>    MEDICATIONS  (PRN):  dextrose Oral Gel 15 Gram(s) Oral once PRN Blood Glucose LESS THAN 70 milliGRAM(s)/deciliter  ondansetron    Tablet 4 milliGRAM(s) Oral every 8 hours PRN Nausea and/or Vomiting  senna 2 Tablet(s) Oral at bedtime PRN Constipation      VITALS:  T(F): 98.2 (10-30-24 @ 09:15), Max: 98.5 (10-29-24 @ 18:03)  HR: 99 (10-30-24 @ 09:15) (81 - 100)  BP: 134/58 (10-30-24 @ 09:15) (116/54 - 153/64)  RR: 18 (10-30-24 @ 09:15) (16 - 18)  SpO2: 98% (10-30-24 @ 09:15)  Wt(kg): --      GENERAL: NAD, well-groomed, well-developed  EYES: Conjunctiva and sclera clear  ENMT: Moist mucous membranes  RESPIRATORY: Clear to auscultation bilaterally; No rales, rhonchi, wheezing, or rubs  CARDIOVASCULAR: Regular rate and rhythm; No murmurs, rubs, or gallops  NERVOUS SYSTEM:  Alert & Oriented X3; Moving all 4 extremities    LABS:              10.7                 137  | 23   | 13           9.35  >-----------< 199     ------------------------< 141                   32.2                 3.8  | 103  | 1.06                                         Ca 8.5   Mg x     Ph x            INR: 1.12 ratio;    PT: 13.3 sec;    PTT: 32.6 sec        Urinalysis Basic - ( 30 Oct 2024 06:06 )    Color: x / Appearance: x / SG: x / pH: x  Gluc: 141 mg/dL / Ketone: x  / Bili: x / Urobili: x   Blood: x / Protein: x / Nitrite: x   Leuk Esterase: x / RBC: x / WBC x   Sq Epi: x / Non Sq Epi: x / Bacteria: x        [x] Care Discussed with Consultants/Other Providers: Urology PA - discussed plan for IR angiogram/embolization today   
CHIEF COMPLAINT: f/u     SUBJECTIVE / OVERNIGHT EVENTS: Patient seen and examined. S/p IR embolization yesterday, states she has some R leg pain but does not endorse weakness, numbness, tingling or swelling at R groin access site. Was able to ambulate normally this AM. Denies chest pain or shortness of breath. Tolerating regular PO. Hematuria resolved, urine is yellow.    MEDICATIONS  (STANDING):  acetaminophen     Tablet .. 975 milliGRAM(s) Oral every 6 hours  dextrose 5%. 1000 milliLiter(s) (50 mL/Hr) IV Continuous <Continuous>  dextrose 5%. 1000 milliLiter(s) (100 mL/Hr) IV Continuous <Continuous>  dextrose 50% Injectable 25 Gram(s) IV Push once  dextrose 50% Injectable 25 Gram(s) IV Push once  dextrose 50% Injectable 12.5 Gram(s) IV Push once  famotidine Injectable 20 milliGRAM(s) IV Push once  glucagon  Injectable 1 milliGRAM(s) IntraMuscular once  heparin   Injectable 5000 Unit(s) SubCutaneous every 8 hours  insulin lispro (ADMELOG) corrective regimen sliding scale   SubCutaneous three times a day with meals  levothyroxine 137 MICROGram(s) Oral daily  linaclotide 290 MICROGram(s) Oral <User Schedule>    MEDICATIONS  (PRN):  dextrose Oral Gel 15 Gram(s) Oral once PRN Blood Glucose LESS THAN 70 milliGRAM(s)/deciliter  ketorolac   Injectable 15 milliGRAM(s) IV Push every 8 hours PRN Moderate Pain (4 - 6)  ondansetron    Tablet 4 milliGRAM(s) Oral every 8 hours PRN Nausea and/or Vomiting  senna 2 Tablet(s) Oral at bedtime PRN Constipation      VITALS:  T(F): 97.8 (10-31-24 @ 09:27), Max: 98.8 (10-31-24 @ 01:00)  HR: 82 (10-31-24 @ 09:27) (77 - 110)  BP: 132/78 (10-31-24 @ 09:27) (127/68 - 157/76)  RR: 18 (10-31-24 @ 09:27) (14 - 25)  SpO2: 99% (10-31-24 @ 09:27)  Wt(kg): --  Height (cm): 175.3 (17:46)  Weight (kg): 83 (17:46)  BMI (kg/m2): 27 (17:46)    GENERAL: NAD, well-groomed, well-developed  EYES: Conjunctiva and sclera clear  ENMT: Moist mucous membranes  RESPIRATORY: Clear to auscultation bilaterally; No rales, rhonchi, wheezing, or rubs  CARDIOVASCULAR: Regular rate and rhythm; No murmurs, rubs, or gallops  NERVOUS SYSTEM:  Alert & Oriented X3; Moving all 4 extremities  SKIN: R femoral groin site c/d/i; no ecchymosis or swelling noted     LABS:              9.4                  139  | 22   | 10           6.91  >-----------< 201     ------------------------< 116                   29.2                 3.6  | 105  | 0.93                                         Ca 8.3   Mg x     Ph x            INR: 1.12 ratio;    PT: 13.3 sec;    PTT: 32.6 sec        Urinalysis Basic - ( 31 Oct 2024 05:50 )    Color: x / Appearance: x / SG: x / pH: x  Gluc: 116 mg/dL / Ketone: x  / Bili: x / Urobili: x   Blood: x / Protein: x / Nitrite: x   Leuk Esterase: x / RBC: x / WBC x   Sq Epi: x / Non Sq Epi: x / Bacteria: x        [x] Care Discussed with Consultants/Other Providers: Urology PA - discussed dc planning home today

## 2024-10-31 NOTE — DISCHARGE NOTE PROVIDER - NSDCMRMEDTOKEN_GEN_ALL_CORE_FT
acetaminophen 325 mg oral tablet: 3 tab(s) orally every 6 hours as needed for pain, alternate with ibuprofen  ibuprofen 200 mg oral tablet: 2 tab(s) orally every 6 hours as needed for pain, alternate with acetaminophen  levothyroxine 137 mcg (0.137 mg) oral tablet: 1 tab(s) orally once a day AM  Linzess 290 mcg oral capsule: 1 cap(s) orally once a day (at bedtime)  metFORMIN 500 mg oral tablet: 1 tab(s) orally 2 times a day  senna leaf extract oral tablet: 2 tab(s) orally once a day (at bedtime) As needed Constipation

## 2024-10-31 NOTE — DISCHARGE NOTE PROVIDER - NSDCCPCAREPLAN_GEN_ALL_CORE_FT
PRINCIPAL DISCHARGE DIAGNOSIS  Diagnosis: Right kidney mass  Assessment and Plan of Treatment: Keep well hydrated.  No heavy lifting (greater than 10 pounds) or straining for 4 to 6 weeks.  You may shower, just pat white strips dry, they will fall off in a few weeks.   Dr. Solorio's office will call you  to schedule a follow up appointment.  Call the office if you have fever greater than 101, difficulty urinating, your urine becomes bloody, pain not relieved with pain medication, nausea/vomiting.  You may remove the dressing on your groin tomorrow.  Call the office if you have fever greater than 101, difficulty urinating, pain not relieved with pain medication, nausea/vomiting.        SECONDARY DISCHARGE DIAGNOSES  Diagnosis: Hypothyroidism  Assessment and Plan of Treatment: Continue current home medications and follow up with your primary care provider      Diagnosis: Pre-diabetes  Assessment and Plan of Treatment: Continue current home medications and follow up with your primary care provider      Diagnosis: History of IBS  Assessment and Plan of Treatment: Continue current home medications and follow up with your primary care provider

## 2024-10-31 NOTE — DISCHARGE NOTE PROVIDER - HOSPITAL COURSE
58 yo F underwent uncomplicated right lap partial nephrectomy on 10/28/2024.  Postoperatively POD #1 urine became bloody with clots, pt remained hemodynamically stable, H&H stable, urine continued to be bloody. Pt underwent IR embo and coil of right lower pole vessel.  POD #3 pt feels well, urine yellow, groin dressing c/d/i.  Pt d/c to f/u with Dr. Solorio.

## 2024-10-31 NOTE — PROGRESS NOTE ADULT - PROBLEM SELECTOR PROBLEM 2
Acute postoperative anemia due to expected blood loss

## 2024-10-31 NOTE — DISCHARGE NOTE PROVIDER - CARE PROVIDER_API CALL
Antoni Solorio  Urology  [address] [place] [address], [address] [address]  Phone: [phone]  Fax: [phone]  Follow Up Time:

## 2024-10-31 NOTE — PROGRESS NOTE ADULT - PROBLEM SELECTOR PLAN 1
Strict I&O's  Analgesia Antiemetics  DVT prophylaxis  Incentive spirometry  Reg / OOB  AM labs
s/p right partial nephrectomy on 10/28   - Care per primary  team   - Wilder removed, passed TOV but continues to have hematuria w/ clots. IR will plan to perform right renal angiogram and embolization  - KAVON drain care per    - Pain control + bowel regimen   - Encourage OOB and incentive spirometry   - DVT ppx: HSQ held due to hematuria as above   - F/u OR pathology  - Post-op labs reviewed, CBC remains stable. KAVON Cr reviewed.
s/p right partial nephrectomy on 10/28. Course c/b persistent hematuria w/ clots. Patient now s/p right renal angiogram and coil embolization of a lower pole arterial branch on 10/30. Hematuria now resolved   - Care per primary  team   - KAVON drain care per    - Pain control + bowel regimen   - Encourage OOB and incentive spirometry   - DVT ppx: HSQ   - F/u OR pathology  - Post-op labs reviewed
s/p right partial nephrectomy on 10/28   - Care per primary  team   - Advance diet per  protocol   - Wilder removed, passed TOV   - KAVON drain care per    - Pain control + bowel regimen   - Encourage OOB and incentive spirometry   - DVT ppx: HSQ  - F/u OR pathology  - Post-op labs reviewed, KAVON Cr reviewed.

## 2024-10-31 NOTE — PROGRESS NOTE ADULT - ASSESSMENT
59y Female with pmh of right renal lesion s/p right partial nephrectomy 10/28 with persistent hematuria post-op. Patient w/ persistent hematuria with downtrending hemoglobin. Patient is now s/p     Plan:  - OK to remove groin dressing 24hrs post procedure   - Monitor H/H  - Urine now clear, yellow     [id number] 59y Female with pmh of right renal lesion s/p right partial nephrectomy 10/28 with persistent hematuria post-op. Patient w/ persistent hematuria with downtrending hemoglobin. Patient is now s/p right renal angiogram and embolization.     Plan:  - OK to remove groin dressing 24hrs post procedure   - Monitor H/H  - Urine now clear, yellow     [id number]

## 2024-10-31 NOTE — PROGRESS NOTE ADULT - ASSESSMENT
59F with hx T2DM, hypothyroidism, IBS, R renal lesion concerning for renal cell carcinoma presenting for right partial nephrectomy. Course c/b persistent hematuria w/ clots. Patient now s/p right renal angiogram and coil embolization of a lower pole arterial branch on 10/30

## 2024-10-31 NOTE — PROGRESS NOTE ADULT - ASSESSMENT
59yof s/p R partial nephrectomy    - S/p IR embolization overnight  - Urine clear  - F/u labs  - Regular diet  - KAVON removed    - Hopeful discharge this afternoon

## 2024-10-31 NOTE — PROGRESS NOTE ADULT - PROBLEM SELECTOR PLAN 4
Hold home Metformin 500mg bid   - A1c = 5.5   - C/w low dose insulin sliding scale   - Monitor FS qAC and qHS.

## 2024-11-01 ENCOUNTER — EMERGENCY (EMERGENCY)
Facility: HOSPITAL | Age: 59
LOS: 1 days | Discharge: HOME | End: 2024-11-01
Attending: EMERGENCY MEDICINE | Admitting: EMERGENCY MEDICINE
Payer: COMMERCIAL

## 2024-11-01 ENCOUNTER — NON-APPOINTMENT (OUTPATIENT)
Age: 59
End: 2024-11-01

## 2024-11-01 VITALS
SYSTOLIC BLOOD PRESSURE: 158 MMHG | TEMPERATURE: 99 F | RESPIRATION RATE: 16 BRPM | HEART RATE: 81 BPM | DIASTOLIC BLOOD PRESSURE: 88 MMHG | OXYGEN SATURATION: 98 %

## 2024-11-01 VITALS
RESPIRATION RATE: 18 BRPM | WEIGHT: 184.97 LBS | DIASTOLIC BLOOD PRESSURE: 101 MMHG | HEART RATE: 83 BPM | HEIGHT: 69 IN | SYSTOLIC BLOOD PRESSURE: 179 MMHG | TEMPERATURE: 99 F | OXYGEN SATURATION: 98 %

## 2024-11-01 DIAGNOSIS — Z98.890 OTHER SPECIFIED POSTPROCEDURAL STATES: Chronic | ICD-10-CM

## 2024-11-01 DIAGNOSIS — Z90.09 ACQUIRED ABSENCE OF OTHER PART OF HEAD AND NECK: Chronic | ICD-10-CM

## 2024-11-01 DIAGNOSIS — Z98.1 ARTHRODESIS STATUS: Chronic | ICD-10-CM

## 2024-11-01 DIAGNOSIS — Z96.89 PRESENCE OF OTHER SPECIFIED FUNCTIONAL IMPLANTS: Chronic | ICD-10-CM

## 2024-11-01 DIAGNOSIS — Z90.711 ACQUIRED ABSENCE OF UTERUS WITH REMAINING CERVICAL STUMP: Chronic | ICD-10-CM

## 2024-11-01 DIAGNOSIS — Z90.49 ACQUIRED ABSENCE OF OTHER SPECIFIED PARTS OF DIGESTIVE TRACT: Chronic | ICD-10-CM

## 2024-11-01 LAB
ALBUMIN SERPL ELPH-MCNC: 3.7 G/DL — SIGNIFICANT CHANGE UP (ref 3.3–5)
ALP SERPL-CCNC: 163 U/L — HIGH (ref 40–120)
ALT FLD-CCNC: 69 U/L — HIGH (ref 4–33)
ANION GAP SERPL CALC-SCNC: 14 MMOL/L — SIGNIFICANT CHANGE UP (ref 7–14)
APPEARANCE UR: ABNORMAL
AST SERPL-CCNC: 34 U/L — HIGH (ref 4–32)
BACTERIA # UR AUTO: NEGATIVE /HPF — SIGNIFICANT CHANGE UP
BASOPHILS # BLD AUTO: 0.06 K/UL — SIGNIFICANT CHANGE UP (ref 0–0.2)
BASOPHILS NFR BLD AUTO: 0.8 % — SIGNIFICANT CHANGE UP (ref 0–2)
BILIRUB SERPL-MCNC: 0.5 MG/DL — SIGNIFICANT CHANGE UP (ref 0.2–1.2)
BILIRUB UR-MCNC: NEGATIVE — SIGNIFICANT CHANGE UP
BUN SERPL-MCNC: 11 MG/DL — SIGNIFICANT CHANGE UP (ref 7–23)
CALCIUM SERPL-MCNC: 9 MG/DL — SIGNIFICANT CHANGE UP (ref 8.4–10.5)
CAST: 0 /LPF — SIGNIFICANT CHANGE UP (ref 0–4)
CHLORIDE SERPL-SCNC: 105 MMOL/L — SIGNIFICANT CHANGE UP (ref 98–107)
CO2 SERPL-SCNC: 23 MMOL/L — SIGNIFICANT CHANGE UP (ref 22–31)
COLOR SPEC: ABNORMAL
CREAT SERPL-MCNC: 0.9 MG/DL — SIGNIFICANT CHANGE UP (ref 0.5–1.3)
DIFF PNL FLD: ABNORMAL
EGFR: 74 ML/MIN/1.73M2 — SIGNIFICANT CHANGE UP
EOSINOPHIL # BLD AUTO: 0.43 K/UL — SIGNIFICANT CHANGE UP (ref 0–0.5)
EOSINOPHIL NFR BLD AUTO: 5.5 % — SIGNIFICANT CHANGE UP (ref 0–6)
GLUCOSE SERPL-MCNC: 91 MG/DL — SIGNIFICANT CHANGE UP (ref 70–99)
GLUCOSE UR QL: NEGATIVE MG/DL — SIGNIFICANT CHANGE UP
HCT VFR BLD CALC: 32.8 % — LOW (ref 34.5–45)
HGB BLD-MCNC: 10.9 G/DL — LOW (ref 11.5–15.5)
IANC: 5.29 K/UL — SIGNIFICANT CHANGE UP (ref 1.8–7.4)
IMM GRANULOCYTES NFR BLD AUTO: 0.4 % — SIGNIFICANT CHANGE UP (ref 0–0.9)
KETONES UR-MCNC: NEGATIVE MG/DL — SIGNIFICANT CHANGE UP
LEUKOCYTE ESTERASE UR-ACNC: ABNORMAL
LYMPHOCYTES # BLD AUTO: 1.32 K/UL — SIGNIFICANT CHANGE UP (ref 1–3.3)
LYMPHOCYTES # BLD AUTO: 16.8 % — SIGNIFICANT CHANGE UP (ref 13–44)
MCHC RBC-ENTMCNC: 30.1 PG — SIGNIFICANT CHANGE UP (ref 27–34)
MCHC RBC-ENTMCNC: 33.2 G/DL — SIGNIFICANT CHANGE UP (ref 32–36)
MCV RBC AUTO: 90.6 FL — SIGNIFICANT CHANGE UP (ref 80–100)
MONOCYTES # BLD AUTO: 0.73 K/UL — SIGNIFICANT CHANGE UP (ref 0–0.9)
MONOCYTES NFR BLD AUTO: 9.3 % — SIGNIFICANT CHANGE UP (ref 2–14)
NEUTROPHILS # BLD AUTO: 5.29 K/UL — SIGNIFICANT CHANGE UP (ref 1.8–7.4)
NEUTROPHILS NFR BLD AUTO: 67.2 % — SIGNIFICANT CHANGE UP (ref 43–77)
NITRITE UR-MCNC: NEGATIVE — SIGNIFICANT CHANGE UP
NRBC # BLD: 0 /100 WBCS — SIGNIFICANT CHANGE UP (ref 0–0)
NRBC # FLD: 0 K/UL — SIGNIFICANT CHANGE UP (ref 0–0)
PH UR: 8 — SIGNIFICANT CHANGE UP (ref 5–8)
PLATELET # BLD AUTO: 272 K/UL — SIGNIFICANT CHANGE UP (ref 150–400)
POTASSIUM SERPL-MCNC: 3.9 MMOL/L — SIGNIFICANT CHANGE UP (ref 3.5–5.3)
POTASSIUM SERPL-SCNC: 3.9 MMOL/L — SIGNIFICANT CHANGE UP (ref 3.5–5.3)
PROT SERPL-MCNC: 7 G/DL — SIGNIFICANT CHANGE UP (ref 6–8.3)
PROT UR-MCNC: 100 MG/DL
RBC # BLD: 3.62 M/UL — LOW (ref 3.8–5.2)
RBC # FLD: 13.2 % — SIGNIFICANT CHANGE UP (ref 10.3–14.5)
RBC CASTS # UR COMP ASSIST: >50 /HPF — SIGNIFICANT CHANGE UP (ref 0–4)
SODIUM SERPL-SCNC: 142 MMOL/L — SIGNIFICANT CHANGE UP (ref 135–145)
SP GR SPEC: 1.01 — SIGNIFICANT CHANGE UP (ref 1–1.03)
SQUAMOUS # UR AUTO: 0 /HPF — SIGNIFICANT CHANGE UP (ref 0–5)
SURGICAL PATHOLOGY STUDY: SIGNIFICANT CHANGE UP
UROBILINOGEN FLD QL: 1 MG/DL — SIGNIFICANT CHANGE UP (ref 0.2–1)
WBC # BLD: 7.86 K/UL — SIGNIFICANT CHANGE UP (ref 3.8–10.5)
WBC # FLD AUTO: 7.86 K/UL — SIGNIFICANT CHANGE UP (ref 3.8–10.5)
WBC UR QL: 2 /HPF — SIGNIFICANT CHANGE UP (ref 0–5)

## 2024-11-01 PROCEDURE — 99284 EMERGENCY DEPT VISIT MOD MDM: CPT

## 2024-11-01 RX ORDER — SULFAMETHOXAZOLE/TRIMETHOPRIM 800-160 MG
1 TABLET ORAL
Qty: 14 | Refills: 0
Start: 2024-11-01 | End: 2024-11-07

## 2024-11-01 RX ORDER — SULFAMETHOXAZOLE/TRIMETHOPRIM 800-160 MG
1 TABLET ORAL ONCE
Refills: 0 | Status: COMPLETED | OUTPATIENT
Start: 2024-11-01 | End: 2024-11-01

## 2024-11-01 RX ADMIN — Medication 1 TABLET(S): at 21:50

## 2024-11-01 NOTE — ED PROVIDER NOTE - PHYSICAL EXAMINATION
GEN - NAD; well appearing; A+O x3; non-toxic appearing  CARD -s1s2, RRR, no M,G,R;   PULM - CTA b/l, symmetric breath sounds;   ABD -  +BS, surgical incisions clean dry intact, ND, NT, soft, no guarding, no rebound, no masses;   BACK - no CVA tenderness, Normal  spine;   EXT - symmetric pulses, 2+ dp, capillary refill < 2 seconds, no cyanosis, no edema;   NEURO - no focal neuro deficits, no slurred speech

## 2024-11-01 NOTE — ED PROVIDER NOTE - CLINICAL SUMMARY MEDICAL DECISION MAKING FREE TEXT BOX
concern for obstructive uropathy with clot in bladder.  Urology at bedside for irrigation and reassessment.  Will check CBC to eval for ABLA, will check creatinine for CY given recent procedure.  Likely d/c home with follow up with Dr. Solorio.  Will D/c with bactrim as per urology.

## 2024-11-01 NOTE — CONSULT NOTE ADULT - SUBJECTIVE AND OBJECTIVE BOX
HPI:    Ms. Hurley is a 59yof s/p recent (10/28) R laparoscopic partial nephrectomy c/b bleed s/p embolization who is presenting for urinary retention.  She had severe flank pain yesterday evening following by inability to urinate and dysuria this morning.  Urine had been clear yellow but subsequently became pink tinged.   Patient felt she was not emptying and endorsed persistent urinary dribbling.  No clots.  Denies fever, lightheadedness dizziness.    POCUS revealed one area of organized clot near bladder neck.    S/p irrigation of ~30cc of clot in emergency room with improvement of symptoms.        O: Vital Signs Last 24 Hrs  T(C): 37.6 (01 Nov 2024 19:53), Max: 37.6 (01 Nov 2024 19:53)  T(F): 99.6 (01 Nov 2024 19:53), Max: 99.6 (01 Nov 2024 19:53)  HR: 82 (01 Nov 2024 19:53) (82 - 88)  BP: 164/91 (01 Nov 2024 18:39) (164/91 - 179/101)  BP(mean): --  RR: 12 (01 Nov 2024 19:53) (12 - 18)  SpO2: 96% (01 Nov 2024 19:53) (96% - 98%)    Parameters below as of 01 Nov 2024 19:53  Patient On (Oxygen Delivery Method): room air            Physical Exam:    Gen: Well-developed, well-nourished in no acute distress  Resp: No additional work of breathing   GI: Soft, non-tender, non-distended, with normoactive bowel sounds.  No masses.  MSK: Moves all extremities equally  Skin: No rashes    Labs:                        9.4    6.91  )-----------( 201      ( 31 Oct 2024 05:50 )             29.2     31 Oct 2024 05:50    139    |  105    |  10     ----------------------------<  116    3.6     |  22     |  0.93     Ca    8.3        31 Oct 2024 05:50        CAPILLARY BLOOD GLUCOSE      POCT Blood Glucose.: 92 mg/dL (01 Nov 2024 17:49)            Urinalysis Basic - ( 31 Oct 2024 05:50 )    Color: x / Appearance: x / SG: x / pH: x  Gluc: 116 mg/dL / Ketone: x  / Bili: x / Urobili: x   Blood: x / Protein: x / Nitrite: x   Leuk Esterase: x / RBC: x / WBC x   Sq Epi: x / Non Sq Epi: x / Bacteria: x        MEDICATIONS  (STANDING):    MEDICATIONS  (PRN):

## 2024-11-01 NOTE — ED PROVIDER NOTE - PATIENT PORTAL LINK FT
You can access the FollowMyHealth Patient Portal offered by [facility] by registering at the following website: [email or web address]. By joining [facility]’s FollowMyHealth portal, you will also be able to view your health information using other applications (apps) compatible with our system.

## 2024-11-01 NOTE — CONSULT NOTE ADULT - ASSESSMENT
59yof s/p R laparoscopic partial nephrectomy c/b bleed s/p embolization.    - IRrigated organized clot with subsequently improved urine color, likely patient was in retention 2/2 ball-valving of clot.  Patient subsequently able to urinate.  - Please send UA/Culture  - CBC/BMP  - If HCT stable ok to discharge from urology perspective  - Please reach out with any additional questions

## 2024-11-01 NOTE — ED ADULT NURSE NOTE - OBJECTIVE STATEMENT
Pt. received to room 1. Pt. is A&Ox4 and ambulatory at baseline. Pt. presents to the ED, sent by MD, after experiencing urinary retention starting at 2pm. Pt. had a partial nephrotomy on 10/28 and IR embolization on 10/31. Pt. past medical hx hypothyroidism, IBS, T2DM. Pt. states drinking 40 oz. of water today. Pt. denies HA, dizziness, chest pain, palpitations. Endorses 10/10 pain to lower abdomen and bladder area. Upon assessment, Pt. chest rise equal b/l, respirations even and unlabored. Midline surgical site to the lower abdomen, clean dry and intact. Skin intact and dry. Bed set to lowest setting, safety maintained throughout.

## 2024-11-01 NOTE — ED ADULT NURSE REASSESSMENT NOTE - NS ED NURSE REASSESS COMMENT FT1
Received report from day RN. Pt resting in stretcher, breathing even and unlabored on room air. NAD noted. Labs and urine sent to lab, results back. Pending dispo. Safety maintained.

## 2024-11-01 NOTE — ED PROVIDER NOTE - NSFOLLOWUPINSTRUCTIONS_ED_ALL_ED_FT
You were seen for inability to urinate and found to have a clot in your bladder.      The urologist irrigated your bladder.    Please take Bactrim 1 tab twice daily for 7 days.      Follow up with your urologist.      SEEK IMMEDIATE MEDICAL CARE IF YOU HAVE ANY OF THE FOLLOWING SYMPTOMS: severe back or abdominal pain, fever, inability to keep fluids or medicine down, dizziness/lightheadedness, or a change in mental status.

## 2024-11-01 NOTE — ED PROVIDER NOTE - OBJECTIVE STATEMENT
58 yo F with T2 DM. Hypothyroidism. IBS, s/p right lap partial nephrectomy on 10/28/2024 and IR embolization on 10/30/2024 a/w urinary retention.  Discharged on 10/31/2024 with urine yellow, groin dressing c/d/i.  Pt reports lower abdominal pressure starting last night with urinary hesitancy today.  Pt noted to have increasing pressure.  Urology at the bedside during initial evaluation with clot noted within the bladder on ultrasound.

## 2024-11-01 NOTE — ED ADULT TRIAGE NOTE - CHIEF COMPLAINT QUOTE
c/o lower abd pain due to urinary retention, reports unable to urine sine being discharged yesterday, was in a hospital s/p kidney surgery for  mass, pt reports was told mass could be malignant. Phx of DM  type 2, IBS

## 2024-11-02 PROBLEM — E11.9 TYPE 2 DIABETES MELLITUS WITHOUT COMPLICATIONS: Chronic | Status: ACTIVE | Noted: 2024-10-16

## 2024-11-02 PROBLEM — K58.9 IRRITABLE BOWEL SYNDROME, UNSPECIFIED: Chronic | Status: ACTIVE | Noted: 2024-10-16

## 2024-11-11 NOTE — PROGRESS NOTE ADULT - PROBLEM SELECTOR PLAN 2
Baseline Hgb ~13.5 -->10.2   - Expected blood loss in post-op state  - Monitor KAVON drain site   - Monitor CBC   - Transfuse for Hgb < 7 or symptomatic
Baseline Hgb ~13.5 -->10.7   - Plan as above for renal angiogram and embolization for hematuria   - Monitor CBC   - Transfuse for Hgb < 7 or symptomatic
[facility]  [address] [place] [address], [address]  [place] [address]  Phone: [phone]  Fax: [phone]  Follow Up Time: 1 week  
Baseline Hgb ~13.5 --> 9.4   - Plan as above for renal angiogram and embolization for hematuria   - Monitor CBC   - Transfuse for Hgb < 7 or symptomatic

## 2024-11-19 ENCOUNTER — APPOINTMENT (OUTPATIENT)
Dept: UROLOGY | Facility: CLINIC | Age: 59
End: 2024-11-19
Payer: COMMERCIAL

## 2024-11-19 ENCOUNTER — NON-APPOINTMENT (OUTPATIENT)
Age: 59
End: 2024-11-19

## 2024-11-19 VITALS
HEART RATE: 69 BPM | WEIGHT: 179 LBS | SYSTOLIC BLOOD PRESSURE: 109 MMHG | RESPIRATION RATE: 17 BRPM | DIASTOLIC BLOOD PRESSURE: 66 MMHG | TEMPERATURE: 97.9 F | HEIGHT: 69 IN | BODY MASS INDEX: 26.51 KG/M2

## 2024-11-19 DIAGNOSIS — C64.9 MALIGNANT NEOPLASM OF UNSPECIFIED KIDNEY, EXCEPT RENAL PELVIS: ICD-10-CM

## 2024-11-19 PROCEDURE — 99024 POSTOP FOLLOW-UP VISIT: CPT

## 2025-02-20 NOTE — PRE-OP CHECKLIST - HEIGHT IN CM
Nurse returned call to pt, she had a procedure completed, and would like to let Luz know she would like some help at home with ADL, Light laundry, and assistance for a few hours per day and a few days per week to help with sweeping and moping. Staff Message sent to provider.    [phone]

## 2025-05-15 ENCOUNTER — NON-APPOINTMENT (OUTPATIENT)
Age: 60
End: 2025-05-15

## 2025-05-15 ENCOUNTER — APPOINTMENT (OUTPATIENT)
Dept: UROLOGY | Facility: CLINIC | Age: 60
End: 2025-05-15
Payer: COMMERCIAL

## 2025-05-15 ENCOUNTER — OUTPATIENT (OUTPATIENT)
Dept: OUTPATIENT SERVICES | Facility: HOSPITAL | Age: 60
LOS: 1 days | End: 2025-05-15
Payer: COMMERCIAL

## 2025-05-15 VITALS
BODY MASS INDEX: 28.44 KG/M2 | DIASTOLIC BLOOD PRESSURE: 87 MMHG | RESPIRATION RATE: 16 BRPM | OXYGEN SATURATION: 97 % | HEIGHT: 69 IN | SYSTOLIC BLOOD PRESSURE: 167 MMHG | WEIGHT: 192 LBS | HEART RATE: 70 BPM

## 2025-05-15 DIAGNOSIS — Z98.890 OTHER SPECIFIED POSTPROCEDURAL STATES: Chronic | ICD-10-CM

## 2025-05-15 DIAGNOSIS — Z96.89 PRESENCE OF OTHER SPECIFIED FUNCTIONAL IMPLANTS: Chronic | ICD-10-CM

## 2025-05-15 DIAGNOSIS — Z90.09 ACQUIRED ABSENCE OF OTHER PART OF HEAD AND NECK: Chronic | ICD-10-CM

## 2025-05-15 DIAGNOSIS — Z98.1 ARTHRODESIS STATUS: Chronic | ICD-10-CM

## 2025-05-15 DIAGNOSIS — Z90.711 ACQUIRED ABSENCE OF UTERUS WITH REMAINING CERVICAL STUMP: Chronic | ICD-10-CM

## 2025-05-15 DIAGNOSIS — R35.0 FREQUENCY OF MICTURITION: ICD-10-CM

## 2025-05-15 DIAGNOSIS — Z90.49 ACQUIRED ABSENCE OF OTHER SPECIFIED PARTS OF DIGESTIVE TRACT: Chronic | ICD-10-CM

## 2025-05-15 DIAGNOSIS — N28.89 OTHER SPECIFIED DISORDERS OF KIDNEY AND URETER: ICD-10-CM

## 2025-05-15 PROCEDURE — 99213 OFFICE O/P EST LOW 20 MIN: CPT

## 2025-05-15 PROCEDURE — G2211 COMPLEX E/M VISIT ADD ON: CPT | Mod: NC

## 2025-05-15 PROCEDURE — 76775 US EXAM ABDO BACK WALL LIM: CPT | Mod: 26

## 2025-05-15 PROCEDURE — 76775 US EXAM ABDO BACK WALL LIM: CPT

## 2025-05-16 DIAGNOSIS — N28.89 OTHER SPECIFIED DISORDERS OF KIDNEY AND URETER: ICD-10-CM

## 2025-05-17 LAB — BACTERIA UR CULT: NORMAL

## 2025-06-04 ENCOUNTER — APPOINTMENT (OUTPATIENT)
Dept: OTOLARYNGOLOGY | Facility: CLINIC | Age: 60
End: 2025-06-04
Payer: COMMERCIAL

## 2025-06-04 VITALS
DIASTOLIC BLOOD PRESSURE: 80 MMHG | SYSTOLIC BLOOD PRESSURE: 149 MMHG | WEIGHT: 192 LBS | BODY MASS INDEX: 28.44 KG/M2 | HEIGHT: 69 IN | HEART RATE: 79 BPM

## 2025-06-04 DIAGNOSIS — J35.9 CHRONIC DISEASE OF TONSILS AND ADENOIDS, UNSPECIFIED: ICD-10-CM

## 2025-06-04 DIAGNOSIS — E04.1 NONTOXIC SINGLE THYROID NODULE: ICD-10-CM

## 2025-06-04 DIAGNOSIS — Z80.9 FAMILY HISTORY OF MALIGNANT NEOPLASM, UNSPECIFIED: ICD-10-CM

## 2025-06-04 DIAGNOSIS — E11.9 TYPE 2 DIABETES MELLITUS W/OUT COMPLICATIONS: ICD-10-CM

## 2025-06-04 PROCEDURE — 99204 OFFICE O/P NEW MOD 45 MIN: CPT

## 2025-06-04 RX ORDER — METFORMIN HYDROCHLORIDE 500 MG/1
500 TABLET, COATED ORAL
Refills: 0 | Status: ACTIVE | COMMUNITY

## 2025-09-03 ENCOUNTER — APPOINTMENT (OUTPATIENT)
Dept: OTOLARYNGOLOGY | Facility: CLINIC | Age: 60
End: 2025-09-03
Payer: COMMERCIAL

## 2025-09-03 DIAGNOSIS — J35.9 CHRONIC DISEASE OF TONSILS AND ADENOIDS, UNSPECIFIED: ICD-10-CM

## 2025-09-03 DIAGNOSIS — R04.0 EPISTAXIS: ICD-10-CM

## 2025-09-03 PROCEDURE — 99214 OFFICE O/P EST MOD 30 MIN: CPT

## (undated) DEVICE — GOWN XXXL

## (undated) DEVICE — LAP PAD 4 X 18"

## (undated) DEVICE — DRAIN BLAKE 10FR ROUND

## (undated) DEVICE — TUBING STRYKEFLOW II SUCTION / IRRIGATOR

## (undated) DEVICE — ELCTR BOVIE PENCIL SMOKE EVACUATION

## (undated) DEVICE — DRAIN RESERVOIR FOR JACKSON PRATT 100CC CARDINAL

## (undated) DEVICE — GLV 7.5 PROTEXIS (CREAM) MICRO

## (undated) DEVICE — SUT POLYSORB 0 60" TIES UNDYED

## (undated) DEVICE — POSITIONER PINK PAD PIGAZZI SYSTEM

## (undated) DEVICE — ENDOCATCH 10MM SPECIMEN POUCH

## (undated) DEVICE — ELCTR GROUNDING PAD ADULT COVIDIEN

## (undated) DEVICE — Device

## (undated) DEVICE — PACK ROBOTIC LIJ

## (undated) DEVICE — FOLEY TRAY 16FR 5CC LF UMETER CLOSED

## (undated) DEVICE — SUT VLOC 180 2-0 6" GS-22 GREEN

## (undated) DEVICE — SUT MONOCRYL 4-0 27" PS-2 UNDYED

## (undated) DEVICE — SUT VICRYL 0 27" UR-6

## (undated) DEVICE — SUT VLOC 180 3-0 6" V-20 GREEN

## (undated) DEVICE — FOLEY TRAY 16FR ADVANCE LUBRICATH COUDE

## (undated) DEVICE — SHEARS COVIDIEN ENDO SHEAR 5MM X 31CM W UNIPOLAR CAUTERY

## (undated) DEVICE — DRSG MASTISOL

## (undated) DEVICE — VENODYNE/SCD SLEEVE CALF MEDIUM

## (undated) DEVICE — SUT VICRYL 2-0 27" SH UNDYED

## (undated) DEVICE — TROCAR SURGIQUEST AIRSEAL 12MMX100MM

## (undated) DEVICE — TUBING AIRSEAL TRI-LUMEN FILTERED

## (undated) DEVICE — LIGASURE ATLAS 10MM 37CM

## (undated) DEVICE — WARMING BLANKET UPPER ADULT

## (undated) DEVICE — GOWN XL

## (undated) DEVICE — DRSG TEGADERM 2.5X3"

## (undated) DEVICE — RETRACTOR COVIDIEN ENDOPADDLE 12MM DISP

## (undated) DEVICE — TIP METZENBAUM SCISSOR MONOPOLAR ENDOCUT (ORANGE)

## (undated) DEVICE — LUBRICATING JELLY ONESHOT 1.25OZ

## (undated) DEVICE — INSUFFLATION NDL ETHICON ENDOPATH VERESS 14G 120MM

## (undated) DEVICE — SUT VLOC 180 0 12" GS-21 GREEN